# Patient Record
Sex: FEMALE | Race: WHITE | NOT HISPANIC OR LATINO
[De-identification: names, ages, dates, MRNs, and addresses within clinical notes are randomized per-mention and may not be internally consistent; named-entity substitution may affect disease eponyms.]

---

## 2017-01-03 ENCOUNTER — APPOINTMENT (OUTPATIENT)
Dept: ANTEPARTUM | Facility: CLINIC | Age: 32
End: 2017-01-03

## 2017-01-11 ENCOUNTER — APPOINTMENT (OUTPATIENT)
Dept: MATERNAL FETAL MEDICINE | Facility: CLINIC | Age: 32
End: 2017-01-11

## 2017-02-15 ENCOUNTER — INPATIENT (INPATIENT)
Facility: HOSPITAL | Age: 32
LOS: 3 days | Discharge: HOME | End: 2017-02-19
Attending: OBSTETRICS & GYNECOLOGY | Admitting: OBSTETRICS & GYNECOLOGY
Payer: COMMERCIAL

## 2017-02-15 VITALS — WEIGHT: 127.87 LBS | HEIGHT: 64 IN

## 2017-02-15 DIAGNOSIS — O24.410 GESTATIONAL DIABETES MELLITUS IN PREGNANCY, DIET CONTROLLED: ICD-10-CM

## 2017-02-15 LAB
BASOPHILS # BLD AUTO: 0 K/UL — SIGNIFICANT CHANGE UP (ref 0–0.2)
BASOPHILS NFR BLD AUTO: 0.1 % — SIGNIFICANT CHANGE UP (ref 0–2)
BLD GP AB SCN SERPL QL: POSITIVE — SIGNIFICANT CHANGE UP
EOSINOPHIL # BLD AUTO: 0.1 K/UL — SIGNIFICANT CHANGE UP (ref 0–0.5)
EOSINOPHIL NFR BLD AUTO: 0.4 % — SIGNIFICANT CHANGE UP (ref 0–6)
HCT VFR BLD CALC: 31.1 % — LOW (ref 34.5–45)
HGB BLD-MCNC: 10.2 G/DL — LOW (ref 11.5–15.5)
LYMPHOCYTES # BLD AUTO: 19.7 % — SIGNIFICANT CHANGE UP (ref 13–44)
LYMPHOCYTES # BLD AUTO: 2.5 K/UL — SIGNIFICANT CHANGE UP (ref 1–3.3)
MCHC RBC-ENTMCNC: 23.3 PG — LOW (ref 27–34)
MCHC RBC-ENTMCNC: 32.7 GM/DL — SIGNIFICANT CHANGE UP (ref 32–36)
MCV RBC AUTO: 71.4 FL — LOW (ref 80–100)
MONOCYTES # BLD AUTO: 1.1 K/UL — HIGH (ref 0–0.9)
MONOCYTES NFR BLD AUTO: 8.6 % — SIGNIFICANT CHANGE UP (ref 2–14)
NEUTROPHILS # BLD AUTO: 9 K/UL — HIGH (ref 1.8–7.4)
NEUTROPHILS NFR BLD AUTO: 71.2 % — SIGNIFICANT CHANGE UP (ref 43–77)
PLATELET # BLD AUTO: 212 K/UL — SIGNIFICANT CHANGE UP (ref 150–400)
RBC # BLD: 4.36 M/UL — SIGNIFICANT CHANGE UP (ref 3.8–5.2)
RBC # FLD: 13.5 % — SIGNIFICANT CHANGE UP (ref 10.3–14.5)
RH IG SCN BLD-IMP: NEGATIVE — SIGNIFICANT CHANGE UP
RH IG SCN BLD-IMP: NEGATIVE — SIGNIFICANT CHANGE UP
WBC # BLD: 12.6 K/UL — HIGH (ref 3.8–10.5)
WBC # FLD AUTO: 12.6 K/UL — HIGH (ref 3.8–10.5)

## 2017-02-15 PROCEDURE — 86077 PHYS BLOOD BANK SERV XMATCH: CPT

## 2017-02-15 RX ORDER — INFLUENZA VIRUS VACCINE 15; 15; 15; 15 UG/.5ML; UG/.5ML; UG/.5ML; UG/.5ML
0.5 SUSPENSION INTRAMUSCULAR ONCE
Qty: 0 | Refills: 0 | Status: COMPLETED | OUTPATIENT
Start: 2017-02-15 | End: 2017-02-15

## 2017-02-15 RX ORDER — CITRIC ACID/SODIUM CITRATE 300-500 MG
15 SOLUTION, ORAL ORAL EVERY 4 HOURS
Qty: 0 | Refills: 0 | Status: DISCONTINUED | OUTPATIENT
Start: 2017-02-15 | End: 2017-02-16

## 2017-02-15 RX ORDER — SODIUM CHLORIDE 9 MG/ML
1000 INJECTION, SOLUTION INTRAVENOUS
Qty: 0 | Refills: 0 | Status: DISCONTINUED | OUTPATIENT
Start: 2017-02-15 | End: 2017-02-16

## 2017-02-15 RX ORDER — SODIUM CHLORIDE 9 MG/ML
1000 INJECTION INTRAMUSCULAR; INTRAVENOUS; SUBCUTANEOUS
Qty: 0 | Refills: 0 | Status: DISCONTINUED | OUTPATIENT
Start: 2017-02-15 | End: 2017-02-16

## 2017-02-15 RX ORDER — SODIUM CHLORIDE 9 MG/ML
1000 INJECTION INTRAMUSCULAR; INTRAVENOUS; SUBCUTANEOUS ONCE
Qty: 0 | Refills: 0 | Status: COMPLETED | OUTPATIENT
Start: 2017-02-15 | End: 2017-02-15

## 2017-02-15 RX ORDER — OXYTOCIN 10 UNIT/ML
333.33 VIAL (ML) INJECTION
Qty: 20 | Refills: 0 | Status: DISCONTINUED | OUTPATIENT
Start: 2017-02-15 | End: 2017-02-16

## 2017-02-15 RX ADMIN — SODIUM CHLORIDE 125 MILLILITER(S): 9 INJECTION, SOLUTION INTRAVENOUS at 19:40

## 2017-02-15 RX ADMIN — SODIUM CHLORIDE 1000 MILLILITER(S): 9 INJECTION INTRAMUSCULAR; INTRAVENOUS; SUBCUTANEOUS at 19:40

## 2017-02-16 ENCOUNTER — RESULT REVIEW (OUTPATIENT)
Age: 32
End: 2017-02-16

## 2017-02-16 ENCOUNTER — TRANSCRIPTION ENCOUNTER (OUTPATIENT)
Age: 32
End: 2017-02-16

## 2017-02-16 LAB — T PALLIDUM AB TITR SER: NEGATIVE — SIGNIFICANT CHANGE UP

## 2017-02-16 RX ORDER — IBUPROFEN 200 MG
600 TABLET ORAL EVERY 6 HOURS
Qty: 0 | Refills: 0 | Status: DISCONTINUED | OUTPATIENT
Start: 2017-02-18 | End: 2017-02-19

## 2017-02-16 RX ORDER — OXYCODONE HYDROCHLORIDE 5 MG/1
5 TABLET ORAL
Qty: 0 | Refills: 0 | Status: DISCONTINUED | OUTPATIENT
Start: 2017-02-17 | End: 2017-02-19

## 2017-02-16 RX ORDER — DEXAMETHASONE 0.5 MG/5ML
4 ELIXIR ORAL EVERY 6 HOURS
Qty: 0 | Refills: 0 | Status: DISCONTINUED | OUTPATIENT
Start: 2017-02-16 | End: 2017-02-18

## 2017-02-16 RX ORDER — SODIUM CHLORIDE 9 MG/ML
1000 INJECTION, SOLUTION INTRAVENOUS
Qty: 0 | Refills: 0 | Status: DISCONTINUED | OUTPATIENT
Start: 2017-02-16 | End: 2017-02-16

## 2017-02-16 RX ORDER — DIPHENHYDRAMINE HCL 50 MG
25 CAPSULE ORAL EVERY 6 HOURS
Qty: 0 | Refills: 0 | Status: DISCONTINUED | OUTPATIENT
Start: 2017-02-17 | End: 2017-02-19

## 2017-02-16 RX ORDER — ACETAMINOPHEN 500 MG
975 TABLET ORAL EVERY 6 HOURS
Qty: 0 | Refills: 0 | Status: DISCONTINUED | OUTPATIENT
Start: 2017-02-17 | End: 2017-02-19

## 2017-02-16 RX ORDER — LANOLIN
1 OINTMENT (GRAM) TOPICAL
Qty: 0 | Refills: 0 | Status: DISCONTINUED | OUTPATIENT
Start: 2017-02-17 | End: 2017-02-19

## 2017-02-16 RX ORDER — OXYTOCIN 10 UNIT/ML
41.67 VIAL (ML) INJECTION
Qty: 20 | Refills: 0 | Status: DISCONTINUED | OUTPATIENT
Start: 2017-02-16 | End: 2017-02-16

## 2017-02-16 RX ORDER — SIMETHICONE 80 MG/1
80 TABLET, CHEWABLE ORAL EVERY 4 HOURS
Qty: 0 | Refills: 0 | Status: DISCONTINUED | OUTPATIENT
Start: 2017-02-17 | End: 2017-02-19

## 2017-02-16 RX ORDER — OXYCODONE HYDROCHLORIDE 5 MG/1
5 TABLET ORAL EVERY 4 HOURS
Qty: 0 | Refills: 0 | Status: DISCONTINUED | OUTPATIENT
Start: 2017-02-18 | End: 2017-02-19

## 2017-02-16 RX ORDER — OXYTOCIN 10 UNIT/ML
333.33 VIAL (ML) INJECTION
Qty: 20 | Refills: 0 | Status: DISCONTINUED | OUTPATIENT
Start: 2017-02-16 | End: 2017-02-19

## 2017-02-16 RX ORDER — ONDANSETRON 8 MG/1
4 TABLET, FILM COATED ORAL EVERY 6 HOURS
Qty: 0 | Refills: 0 | Status: DISCONTINUED | OUTPATIENT
Start: 2017-02-16 | End: 2017-02-18

## 2017-02-16 RX ORDER — SODIUM CHLORIDE 9 MG/ML
1000 INJECTION, SOLUTION INTRAVENOUS
Qty: 0 | Refills: 0 | Status: DISCONTINUED | OUTPATIENT
Start: 2017-02-17 | End: 2017-02-19

## 2017-02-16 RX ORDER — HEPARIN SODIUM 5000 [USP'U]/ML
5000 INJECTION INTRAVENOUS; SUBCUTANEOUS EVERY 12 HOURS
Qty: 0 | Refills: 0 | Status: DISCONTINUED | OUTPATIENT
Start: 2017-02-17 | End: 2017-02-19

## 2017-02-16 RX ORDER — OXYTOCIN 10 UNIT/ML
4 VIAL (ML) INJECTION
Qty: 30 | Refills: 0 | Status: DISCONTINUED | OUTPATIENT
Start: 2017-02-16 | End: 2017-02-19

## 2017-02-16 RX ORDER — FENTANYL CITRATE 50 UG/ML
250 INJECTION INTRAVENOUS
Qty: 0 | Refills: 0 | Status: DISCONTINUED | OUTPATIENT
Start: 2017-02-16 | End: 2017-02-18

## 2017-02-16 RX ORDER — KETOROLAC TROMETHAMINE 30 MG/ML
30 SYRINGE (ML) INJECTION EVERY 6 HOURS
Qty: 0 | Refills: 0 | Status: DISCONTINUED | OUTPATIENT
Start: 2017-02-16 | End: 2017-02-19

## 2017-02-16 RX ORDER — OXYTOCIN 10 UNIT/ML
4 VIAL (ML) INJECTION
Qty: 30 | Refills: 0 | Status: DISCONTINUED | OUTPATIENT
Start: 2017-02-16 | End: 2017-02-16

## 2017-02-16 RX ORDER — TETANUS TOXOID, REDUCED DIPHTHERIA TOXOID AND ACELLULAR PERTUSSIS VACCINE, ADSORBED 5; 2.5; 8; 8; 2.5 [IU]/.5ML; [IU]/.5ML; UG/.5ML; UG/.5ML; UG/.5ML
0.5 SUSPENSION INTRAMUSCULAR ONCE
Qty: 0 | Refills: 0 | Status: DISCONTINUED | OUTPATIENT
Start: 2017-02-17 | End: 2017-02-19

## 2017-02-16 RX ORDER — NALOXONE HYDROCHLORIDE 4 MG/.1ML
0.1 SPRAY NASAL
Qty: 0 | Refills: 0 | Status: DISCONTINUED | OUTPATIENT
Start: 2017-02-16 | End: 2017-02-18

## 2017-02-16 RX ORDER — GLYCERIN ADULT
1 SUPPOSITORY, RECTAL RECTAL AT BEDTIME
Qty: 0 | Refills: 0 | Status: DISCONTINUED | OUTPATIENT
Start: 2017-02-17 | End: 2017-02-19

## 2017-02-16 RX ADMIN — Medication 4 MILLIUNIT(S)/MIN: at 13:58

## 2017-02-16 RX ADMIN — SODIUM CHLORIDE 125 MILLILITER(S): 9 INJECTION, SOLUTION INTRAVENOUS at 04:11

## 2017-02-16 RX ADMIN — FENTANYL CITRATE 250 MILLILITER(S): 50 INJECTION INTRAVENOUS at 22:50

## 2017-02-16 RX ADMIN — Medication 15 MILLILITER(S): at 16:48

## 2017-02-16 RX ADMIN — Medication 4 MILLIUNIT(S)/MIN: at 14:36

## 2017-02-16 RX ADMIN — Medication 4 MILLIUNIT(S)/MIN: at 07:30

## 2017-02-16 RX ADMIN — Medication 15 MILLILITER(S): at 07:52

## 2017-02-16 RX ADMIN — FENTANYL CITRATE 250 MILLILITER(S): 50 INJECTION INTRAVENOUS at 21:10

## 2017-02-16 RX ADMIN — Medication 4 MILLIUNIT(S)/MIN: at 04:12

## 2017-02-16 RX ADMIN — Medication 15 MILLILITER(S): at 12:17

## 2017-02-17 LAB
BASOPHILS # BLD AUTO: 0 K/UL — SIGNIFICANT CHANGE UP (ref 0–0.2)
BASOPHILS NFR BLD AUTO: 0.1 % — SIGNIFICANT CHANGE UP (ref 0–2)
EOSINOPHIL # BLD AUTO: 0 K/UL — SIGNIFICANT CHANGE UP (ref 0–0.5)
EOSINOPHIL NFR BLD AUTO: 0.2 % — SIGNIFICANT CHANGE UP (ref 0–6)
HCT VFR BLD CALC: 27.3 % — LOW (ref 34.5–45)
HGB BLD-MCNC: 9 G/DL — LOW (ref 11.5–15.5)
KLEIHAUER-BETKE CALCULATION: 0 % — SIGNIFICANT CHANGE UP (ref 0–0.3)
LYMPHOCYTES # BLD AUTO: 1.9 K/UL — SIGNIFICANT CHANGE UP (ref 1–3.3)
LYMPHOCYTES # BLD AUTO: 10.1 % — LOW (ref 13–44)
MCHC RBC-ENTMCNC: 23.9 PG — LOW (ref 27–34)
MCHC RBC-ENTMCNC: 32.8 GM/DL — SIGNIFICANT CHANGE UP (ref 32–36)
MCV RBC AUTO: 72.9 FL — LOW (ref 80–100)
MONOCYTES # BLD AUTO: 1.3 K/UL — HIGH (ref 0–0.9)
MONOCYTES NFR BLD AUTO: 7 % — SIGNIFICANT CHANGE UP (ref 2–14)
NEUTROPHILS # BLD AUTO: 15.2 K/UL — HIGH (ref 1.8–7.4)
NEUTROPHILS NFR BLD AUTO: 82.6 % — HIGH (ref 43–77)
PLATELET # BLD AUTO: 185 K/UL — SIGNIFICANT CHANGE UP (ref 150–400)
RBC # BLD: 3.75 M/UL — LOW (ref 3.8–5.2)
RBC # FLD: 13.5 % — SIGNIFICANT CHANGE UP (ref 10.3–14.5)
WBC # BLD: 18.4 K/UL — HIGH (ref 3.8–10.5)
WBC # FLD AUTO: 18.4 K/UL — HIGH (ref 3.8–10.5)

## 2017-02-17 PROCEDURE — 88307 TISSUE EXAM BY PATHOLOGIST: CPT | Mod: 26

## 2017-02-17 RX ORDER — FERROUS SULFATE 325(65) MG
325 TABLET ORAL
Qty: 0 | Refills: 0 | Status: DISCONTINUED | OUTPATIENT
Start: 2017-02-17 | End: 2017-02-19

## 2017-02-17 RX ORDER — DOCUSATE SODIUM 100 MG
100 CAPSULE ORAL
Qty: 0 | Refills: 0 | Status: DISCONTINUED | OUTPATIENT
Start: 2017-02-17 | End: 2017-02-19

## 2017-02-17 RX ORDER — ASCORBIC ACID 60 MG
250 TABLET,CHEWABLE ORAL
Qty: 0 | Refills: 0 | Status: DISCONTINUED | OUTPATIENT
Start: 2017-02-17 | End: 2017-02-19

## 2017-02-17 RX ORDER — DOCUSATE SODIUM 100 MG
100 CAPSULE ORAL
Qty: 0 | Refills: 0 | Status: DISCONTINUED | OUTPATIENT
Start: 2017-02-17 | End: 2017-02-17

## 2017-02-17 RX ORDER — FERROUS SULFATE 325(65) MG
325 TABLET ORAL DAILY
Qty: 0 | Refills: 0 | Status: DISCONTINUED | OUTPATIENT
Start: 2017-02-17 | End: 2017-02-17

## 2017-02-17 RX ADMIN — Medication 30 MILLIGRAM(S): at 05:00

## 2017-02-17 RX ADMIN — Medication 30 MILLIGRAM(S): at 14:44

## 2017-02-17 RX ADMIN — Medication 30 MILLIGRAM(S): at 08:47

## 2017-02-17 RX ADMIN — Medication 975 MILLIGRAM(S): at 11:54

## 2017-02-17 RX ADMIN — Medication 1 TABLET(S): at 11:54

## 2017-02-17 RX ADMIN — Medication 30 MILLIGRAM(S): at 20:59

## 2017-02-17 RX ADMIN — FENTANYL CITRATE 250 MILLILITER(S): 50 INJECTION INTRAVENOUS at 07:09

## 2017-02-17 RX ADMIN — Medication 325 MILLIGRAM(S): at 08:47

## 2017-02-17 RX ADMIN — Medication 30 MILLIGRAM(S): at 09:15

## 2017-02-17 RX ADMIN — Medication 975 MILLIGRAM(S): at 23:38

## 2017-02-17 RX ADMIN — Medication 975 MILLIGRAM(S): at 17:21

## 2017-02-17 RX ADMIN — SIMETHICONE 80 MILLIGRAM(S): 80 TABLET, CHEWABLE ORAL at 23:37

## 2017-02-17 RX ADMIN — Medication 30 MILLIGRAM(S): at 03:39

## 2017-02-17 RX ADMIN — Medication 250 MILLIGRAM(S): at 17:21

## 2017-02-17 RX ADMIN — HEPARIN SODIUM 5000 UNIT(S): 5000 INJECTION INTRAVENOUS; SUBCUTANEOUS at 17:20

## 2017-02-17 RX ADMIN — HEPARIN SODIUM 5000 UNIT(S): 5000 INJECTION INTRAVENOUS; SUBCUTANEOUS at 05:00

## 2017-02-17 RX ADMIN — Medication 975 MILLIGRAM(S): at 12:40

## 2017-02-17 RX ADMIN — FENTANYL CITRATE 250 MILLILITER(S): 50 INJECTION INTRAVENOUS at 12:36

## 2017-02-17 RX ADMIN — Medication 30 MILLIGRAM(S): at 15:10

## 2017-02-17 RX ADMIN — Medication 30 MILLIGRAM(S): at 21:57

## 2017-02-17 RX ADMIN — Medication 325 MILLIGRAM(S): at 17:21

## 2017-02-17 RX ADMIN — SODIUM CHLORIDE 125 MILLILITER(S): 9 INJECTION, SOLUTION INTRAVENOUS at 12:35

## 2017-02-17 RX ADMIN — Medication 100 MILLIGRAM(S): at 17:21

## 2017-02-17 NOTE — DIETITIAN INITIAL EVALUATION ADULT. - NS AS NUTRI INTERV ED CONTENT
Pt educated on postpartum dietary recommendations, including risk of development of T2DM and reinforced importance of DM screening 6-8 weeks postpartum. Encouraged balanced, healthy meals, physical activity as permitted by MD & encouraged adequate hydration and taking prenatal multivitamin supplements.

## 2017-02-17 NOTE — DIETITIAN INITIAL EVALUATION ADULT. - OTHER INFO
pt seen for consult for GDM. pt reports during pregnancy, GDM was diet controlled; Finger sticks were always WNL c diet and exercise. pt reports a reaction to shellfish once before- broke out in hives, but other than that no other allergies. pt c some questions regarding follow up for GDM. pt confirmed taking prenantal multivitamin and DHA PTA and calcium supplements. pt reports she is planning on breastfeeding.

## 2017-02-17 NOTE — DIETITIAN INITIAL EVALUATION ADULT. - ENERGY NEEDS
ht: 5'4" , wt: 109 pounds, BMI: 18.7 kg/m2, IBW: 120 pounds +/- 10%     pt S/P  at 40.2 weeks, diet controlled GDM.

## 2017-02-18 RX ADMIN — Medication 100 MILLIGRAM(S): at 06:09

## 2017-02-18 RX ADMIN — OXYCODONE HYDROCHLORIDE 5 MILLIGRAM(S): 5 TABLET ORAL at 21:45

## 2017-02-18 RX ADMIN — Medication 975 MILLIGRAM(S): at 11:40

## 2017-02-18 RX ADMIN — OXYCODONE HYDROCHLORIDE 5 MILLIGRAM(S): 5 TABLET ORAL at 15:04

## 2017-02-18 RX ADMIN — Medication 975 MILLIGRAM(S): at 06:09

## 2017-02-18 RX ADMIN — Medication 600 MILLIGRAM(S): at 20:52

## 2017-02-18 RX ADMIN — HEPARIN SODIUM 5000 UNIT(S): 5000 INJECTION INTRAVENOUS; SUBCUTANEOUS at 18:18

## 2017-02-18 RX ADMIN — OXYCODONE HYDROCHLORIDE 5 MILLIGRAM(S): 5 TABLET ORAL at 07:56

## 2017-02-18 RX ADMIN — Medication 325 MILLIGRAM(S): at 18:18

## 2017-02-18 RX ADMIN — Medication 600 MILLIGRAM(S): at 15:04

## 2017-02-18 RX ADMIN — Medication 1 TABLET(S): at 11:40

## 2017-02-18 RX ADMIN — Medication 975 MILLIGRAM(S): at 00:30

## 2017-02-18 RX ADMIN — OXYCODONE HYDROCHLORIDE 5 MILLIGRAM(S): 5 TABLET ORAL at 20:53

## 2017-02-18 RX ADMIN — Medication 30 MILLIGRAM(S): at 06:09

## 2017-02-18 RX ADMIN — Medication 250 MILLIGRAM(S): at 18:18

## 2017-02-18 RX ADMIN — Medication 30 MILLIGRAM(S): at 06:38

## 2017-02-18 RX ADMIN — Medication 975 MILLIGRAM(S): at 18:17

## 2017-02-18 RX ADMIN — Medication 325 MILLIGRAM(S): at 11:40

## 2017-02-18 RX ADMIN — SIMETHICONE 80 MILLIGRAM(S): 80 TABLET, CHEWABLE ORAL at 06:09

## 2017-02-18 RX ADMIN — Medication 600 MILLIGRAM(S): at 14:34

## 2017-02-18 RX ADMIN — Medication 100 MILLIGRAM(S): at 18:18

## 2017-02-18 RX ADMIN — HEPARIN SODIUM 5000 UNIT(S): 5000 INJECTION INTRAVENOUS; SUBCUTANEOUS at 06:10

## 2017-02-18 RX ADMIN — OXYCODONE HYDROCHLORIDE 5 MILLIGRAM(S): 5 TABLET ORAL at 07:26

## 2017-02-18 RX ADMIN — OXYCODONE HYDROCHLORIDE 5 MILLIGRAM(S): 5 TABLET ORAL at 18:18

## 2017-02-18 RX ADMIN — Medication 975 MILLIGRAM(S): at 12:10

## 2017-02-18 RX ADMIN — Medication 975 MILLIGRAM(S): at 06:37

## 2017-02-18 RX ADMIN — OXYCODONE HYDROCHLORIDE 5 MILLIGRAM(S): 5 TABLET ORAL at 14:34

## 2017-02-18 RX ADMIN — OXYCODONE HYDROCHLORIDE 5 MILLIGRAM(S): 5 TABLET ORAL at 11:39

## 2017-02-18 RX ADMIN — OXYCODONE HYDROCHLORIDE 5 MILLIGRAM(S): 5 TABLET ORAL at 12:10

## 2017-02-18 RX ADMIN — Medication 250 MILLIGRAM(S): at 06:09

## 2017-02-18 RX ADMIN — Medication 600 MILLIGRAM(S): at 21:45

## 2017-02-19 ENCOUNTER — TRANSCRIPTION ENCOUNTER (OUTPATIENT)
Age: 32
End: 2017-02-19

## 2017-02-19 VITALS
DIASTOLIC BLOOD PRESSURE: 82 MMHG | TEMPERATURE: 98 F | SYSTOLIC BLOOD PRESSURE: 120 MMHG | HEART RATE: 87 BPM | RESPIRATION RATE: 18 BRPM | OXYGEN SATURATION: 98 %

## 2017-02-19 LAB
BASOPHILS # BLD AUTO: 0 K/UL — SIGNIFICANT CHANGE UP (ref 0–0.2)
BASOPHILS NFR BLD AUTO: 0.4 % — SIGNIFICANT CHANGE UP (ref 0–2)
EOSINOPHIL # BLD AUTO: 0.1 K/UL — SIGNIFICANT CHANGE UP (ref 0–0.5)
EOSINOPHIL NFR BLD AUTO: 1.5 % — SIGNIFICANT CHANGE UP (ref 0–6)
HCT VFR BLD CALC: 27.1 % — LOW (ref 34.5–45)
HGB BLD-MCNC: 8.7 G/DL — LOW (ref 11.5–15.5)
LYMPHOCYTES # BLD AUTO: 2.3 K/UL — SIGNIFICANT CHANGE UP (ref 1–3.3)
LYMPHOCYTES # BLD AUTO: 23.3 % — SIGNIFICANT CHANGE UP (ref 13–44)
MCHC RBC-ENTMCNC: 23.8 PG — LOW (ref 27–34)
MCHC RBC-ENTMCNC: 32.3 GM/DL — SIGNIFICANT CHANGE UP (ref 32–36)
MCV RBC AUTO: 73.7 FL — LOW (ref 80–100)
MONOCYTES # BLD AUTO: 0.8 K/UL — SIGNIFICANT CHANGE UP (ref 0–0.9)
MONOCYTES NFR BLD AUTO: 7.8 % — SIGNIFICANT CHANGE UP (ref 2–14)
NEUTROPHILS # BLD AUTO: 6.6 K/UL — SIGNIFICANT CHANGE UP (ref 1.8–7.4)
NEUTROPHILS NFR BLD AUTO: 66.9 % — SIGNIFICANT CHANGE UP (ref 43–77)
PLATELET # BLD AUTO: 201 K/UL — SIGNIFICANT CHANGE UP (ref 150–400)
RBC # BLD: 3.67 M/UL — LOW (ref 3.8–5.2)
RBC # FLD: 14.3 % — SIGNIFICANT CHANGE UP (ref 10.3–14.5)
WBC # BLD: 9.8 K/UL — SIGNIFICANT CHANGE UP (ref 3.8–10.5)
WBC # FLD AUTO: 9.8 K/UL — SIGNIFICANT CHANGE UP (ref 3.8–10.5)

## 2017-02-19 PROCEDURE — 86901 BLOOD TYPING SEROLOGIC RH(D): CPT

## 2017-02-19 PROCEDURE — 88307 TISSUE EXAM BY PATHOLOGIST: CPT

## 2017-02-19 PROCEDURE — 86780 TREPONEMA PALLIDUM: CPT

## 2017-02-19 PROCEDURE — 86850 RBC ANTIBODY SCREEN: CPT

## 2017-02-19 PROCEDURE — 59025 FETAL NON-STRESS TEST: CPT

## 2017-02-19 PROCEDURE — 85027 COMPLETE CBC AUTOMATED: CPT

## 2017-02-19 PROCEDURE — 85460 HEMOGLOBIN FETAL: CPT

## 2017-02-19 PROCEDURE — 86900 BLOOD TYPING SEROLOGIC ABO: CPT

## 2017-02-19 PROCEDURE — 86870 RBC ANTIBODY IDENTIFICATION: CPT

## 2017-02-19 PROCEDURE — 59050 FETAL MONITOR W/REPORT: CPT

## 2017-02-19 RX ORDER — IBUPROFEN 200 MG
1 TABLET ORAL
Qty: 0 | Refills: 0 | DISCHARGE
Start: 2017-02-19

## 2017-02-19 RX ORDER — OXYCODONE HYDROCHLORIDE 5 MG/1
1 TABLET ORAL
Qty: 0 | Refills: 0 | DISCHARGE
Start: 2017-02-19

## 2017-02-19 RX ORDER — FERROUS SULFATE 325(65) MG
1 TABLET ORAL
Qty: 90 | Refills: 0
Start: 2017-02-19 | End: 2017-03-21

## 2017-02-19 RX ORDER — ACETAMINOPHEN 500 MG
3 TABLET ORAL
Qty: 0 | Refills: 0 | DISCHARGE
Start: 2017-02-19

## 2017-02-19 RX ADMIN — Medication 600 MILLIGRAM(S): at 13:32

## 2017-02-19 RX ADMIN — Medication 600 MILLIGRAM(S): at 05:39

## 2017-02-19 RX ADMIN — OXYCODONE HYDROCHLORIDE 5 MILLIGRAM(S): 5 TABLET ORAL at 12:08

## 2017-02-19 RX ADMIN — Medication 600 MILLIGRAM(S): at 12:06

## 2017-02-19 RX ADMIN — Medication 1 TABLET(S): at 12:06

## 2017-02-19 RX ADMIN — Medication 100 MILLIGRAM(S): at 05:39

## 2017-02-19 RX ADMIN — Medication 975 MILLIGRAM(S): at 01:40

## 2017-02-19 RX ADMIN — Medication 325 MILLIGRAM(S): at 08:27

## 2017-02-19 RX ADMIN — Medication 975 MILLIGRAM(S): at 02:25

## 2017-02-19 RX ADMIN — Medication 250 MILLIGRAM(S): at 05:39

## 2017-02-19 RX ADMIN — OXYCODONE HYDROCHLORIDE 5 MILLIGRAM(S): 5 TABLET ORAL at 13:33

## 2017-02-19 RX ADMIN — Medication 975 MILLIGRAM(S): at 09:30

## 2017-02-19 RX ADMIN — HEPARIN SODIUM 5000 UNIT(S): 5000 INJECTION INTRAVENOUS; SUBCUTANEOUS at 05:38

## 2017-02-19 RX ADMIN — Medication 600 MILLIGRAM(S): at 06:30

## 2017-02-19 RX ADMIN — Medication 975 MILLIGRAM(S): at 08:30

## 2017-02-19 NOTE — DISCHARGE NOTE OB - MEDICATION SUMMARY - MEDICATIONS TO TAKE
I will START or STAY ON the medications listed below when I get home from the hospital:    acetaminophen 325 mg oral tablet  -- 3 tab(s) by mouth every 6 hours  -- Indication: For Encounter for full-term uncomplicated delivery    ibuprofen 600 mg oral tablet  -- 1 tab(s) by mouth every 6 hours  -- Indication: For Encounter for full-term uncomplicated delivery    oxyCODONE 5 mg oral tablet  -- 1 tab(s) by mouth every 3 hours  -- Indication: For Encounter for full-term uncomplicated delivery    oxyCODONE 5 mg oral tablet  -- 1 tab(s) by mouth every 4 hours, As needed, Severe Pain (7 - 10)  -- Indication: For Encounter for full-term uncomplicated delivery    ferrous sulfate 325 mg (65 mg elemental iron) oral tablet  -- 1 tab(s) by mouth 3 times a day  -- Indication: For Encounter for full-term uncomplicated delivery    PNV Prenatal Plus oral tablet  -- 1 tab(s) by mouth once a day  -- Indication: For Encounter for full-term uncomplicated delivery

## 2017-02-19 NOTE — DISCHARGE NOTE OB - CARE PLAN
Principal Discharge DX:	 delivery delivered  Goal:	D/C HOME  Instructions for follow-up, activity and diet:	F/U 2 WEEKS  Secondary Diagnosis:	GDM (gestational diabetes mellitus), class A1

## 2017-02-19 NOTE — DISCHARGE NOTE OB - REASON FOR ADMISSION
iup term, 40W2D, A1DM. PO CYTOTEC, CF, PITOCIN, EPI. IUPC, AMNIOINFUSION.PT HAD 1 LFT C/S FOR ARREST LABOR & NRFHRT.  MALE, APGAR 6/9. D/C POD#3.

## 2017-02-19 NOTE — DISCHARGE NOTE OB - PATIENT PORTAL LINK FT
“You can access the [employer] Patient Portal, offered by [facility], by registering with the following website: [email or web address]”

## 2017-02-19 NOTE — DISCHARGE NOTE OB - CARE PROVIDER_API CALL
Kevin Gomez), Obstetrics and Gynecology  [address]  [place] [address]  Phone: [phone]  Fax: [phone]

## 2017-02-19 NOTE — DISCHARGE NOTE OB - MATERIALS PROVIDED
Breastfeeding Log/[facility]  Screening Program/  Immunization Record/Breastfeeding Mother’s Support Group Information/Guide to Postpartum Care/Vaccinations/Breastfeeding Guide and Packet/Back To Sleep Handout

## 2017-02-19 NOTE — DISCHARGE NOTE OB - HOSPITAL COURSE
IUP 40 + WEEKS. A1 DM FOR IOL. CYTOTEC, CF, PITOCIN, EPI. ARREST OF LABOR & NRFHRT. HAD 1 LFT C/S MALE. D/C POD#3

## 2017-03-30 ENCOUNTER — RESULT REVIEW (OUTPATIENT)
Age: 32
End: 2017-03-30

## 2017-04-06 ENCOUNTER — APPOINTMENT (OUTPATIENT)
Dept: MATERNAL FETAL MEDICINE | Facility: CLINIC | Age: 32
End: 2017-04-06

## 2017-04-20 ENCOUNTER — APPOINTMENT (OUTPATIENT)
Dept: MATERNAL FETAL MEDICINE | Facility: CLINIC | Age: 32
End: 2017-04-20

## 2017-04-21 LAB
GLUCOSE 2H P 100 G GLC PO SERPL-MCNC: 126 MG/DL
GLUCOSE BS SERPL-MCNC: 73 MG/DL

## 2017-09-20 ENCOUNTER — TRANSCRIPTION ENCOUNTER (OUTPATIENT)
Age: 32
End: 2017-09-20

## 2017-12-20 ENCOUNTER — TRANSCRIPTION ENCOUNTER (OUTPATIENT)
Age: 32
End: 2017-12-20

## 2018-06-06 ENCOUNTER — RESULT REVIEW (OUTPATIENT)
Age: 33
End: 2018-06-06

## 2018-12-18 ENCOUNTER — TRANSCRIPTION ENCOUNTER (OUTPATIENT)
Age: 33
End: 2018-12-18

## 2019-02-19 ENCOUNTER — APPOINTMENT (OUTPATIENT)
Dept: INTERNAL MEDICINE | Facility: CLINIC | Age: 34
End: 2019-02-19
Payer: COMMERCIAL

## 2019-02-19 VITALS
BODY MASS INDEX: 18.61 KG/M2 | DIASTOLIC BLOOD PRESSURE: 70 MMHG | TEMPERATURE: 97.9 F | HEIGHT: 64 IN | SYSTOLIC BLOOD PRESSURE: 110 MMHG | RESPIRATION RATE: 14 BRPM | OXYGEN SATURATION: 99 % | WEIGHT: 109 LBS | HEART RATE: 74 BPM

## 2019-02-19 DIAGNOSIS — D56.9 THALASSEMIA, UNSPECIFIED: ICD-10-CM

## 2019-02-19 DIAGNOSIS — Z87.891 PERSONAL HISTORY OF NICOTINE DEPENDENCE: ICD-10-CM

## 2019-02-19 DIAGNOSIS — L50.9 URTICARIA, UNSPECIFIED: ICD-10-CM

## 2019-02-19 DIAGNOSIS — Z00.00 ENCOUNTER FOR GENERAL ADULT MEDICAL EXAMINATION W/OUT ABNORMAL FINDINGS: ICD-10-CM

## 2019-02-19 DIAGNOSIS — Z86.19 PERSONAL HISTORY OF OTHER INFECTIOUS AND PARASITIC DISEASES: ICD-10-CM

## 2019-02-19 DIAGNOSIS — Z87.898 PERSONAL HISTORY OF OTHER SPECIFIED CONDITIONS: ICD-10-CM

## 2019-02-19 DIAGNOSIS — O24.419 GESTATIONAL DIABETES MELLITUS IN PREGNANCY, UNSPECIFIED CONTROL: ICD-10-CM

## 2019-02-19 PROCEDURE — 99385 PREV VISIT NEW AGE 18-39: CPT | Mod: 25

## 2019-02-19 PROCEDURE — 36415 COLL VENOUS BLD VENIPUNCTURE: CPT

## 2019-02-19 RX ORDER — VALACYCLOVIR 500 MG/1
TABLET, FILM COATED ORAL
Refills: 0 | Status: ACTIVE | COMMUNITY

## 2019-02-19 NOTE — HISTORY OF PRESENT ILLNESS
[Health Maintenance] : health maintenance [Spouse] : spouse [] :  [___ Year(s) Ago] : [unfilled] year(s) ago [___ Son(s)] : [unfilled] son(s) [Former Cigarette Smoker] : is a former cigarette smoker [Quit Cigarettes: ___] : quit smoking [unfilled] [Occasional Use] : occasional alcohol use [Never] : has never used illicit drugs [Good] : good [Reg. 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  Hx similar x 1 her.  Denies facial/tongue swelling or sob.\par -no meds taken at onset of sx\par -denies new foods, meds, detergents, lotions, body sprays, hair products; no others in home with similar\par -hx hives with shellfish and zithromax\par \par hx gestational DM- delivered 2017, no hx medications- diet/exercise controlled\par -on prenatal MVI as open to pregnancy\par \par hx fibrocystic breasts- s/p benign biopsies, last right 2004.  \par -hx breast exam 6/18\par -hx breast specialist ethel, last seen 2004 was getting sono q 6 mo- last 2013, told nl.  Denies hx mammograms.\par -+FH mom at breast ca at 48 yo (DCIS), MGM hx breast ca, no hx genetic testing\par \par hx anemia- dx'd Mediterranean anemia since childhood\par -denies hx blood transfusion\par \par reports stable wt in past yr\par eating healthy\par exercising: stationary bike, light wts- as able, q 2 weeks.  Stays active.\par \par Denies GI or  complaints.  Denies hx STD dx.\par

## 2019-02-19 NOTE — PAST MEDICAL HISTORY
[Total Preg ___] : G[unfilled] [Living ___] : Living: [unfilled] [Menstruating] : menstruating [Definite ___ (Date)] : the last menstrual period was [unfilled] [Regular Cycle Intervals] : have been regular [Dysmenorrhea] : dysmenorrhea

## 2019-02-19 NOTE — REVIEW OF SYSTEMS
[Negative] : Psychiatric [Earache] : no earache [Chest Pain] : no chest pain [Cough] : no cough [Dyspnea on Exertion] : no dyspnea on exertion [Dizziness] : no dizziness [de-identified] : see HPI

## 2019-02-19 NOTE — ASSESSMENT
[FreeTextEntry1] : \par hx hives- on/off, unclear etiology.  Asx currently.\par -A/I referral for eval\par -sx diary advised to ID triggers; to continue to avoid known allergies (ie shellfish)\par -Benadryl prn\par -advised prompt ER eval if any facial/tongue swelling, throat sx's etc.\par \par hx gestational DM- delivered 2017, no hx medications- diet/exercise controlled\par -check A1c\par \par hx fibrocystic breasts, +FH breast ca- s/p benign biopsies, last right 2004.  Asx.\par -breast specialist referral\par -declines breast imaging, defers to specialist\par -medical genetics referral\par \par hx anemia- hx Mediterranean anemia, heavy menses, asx\par -check cbc\par \par cerumen- asx\par -advised to avoid qtips, use OTC wax drops with cotton\par -to f/u if sx onset, ENT desired\par \par splitting nails-\par -hx similar when advised to avoid nail polish by derm, not adherent- encouraged\par -check TSH\par -advised to use gloves with dish washing\par -derm eval\par \par \par HCM\par -fasting screening labs; agreeable to STD/HIV screening\par -declines flu shot\par -hx Tdap 2016\par -hx negative PAP 6/18 by GYN Dr. Johnson\par -derm referral for screening and eval of splitting nails.  Regular use of sun block for skin cancer prevention advised.\par -yearly dental screening advised\par -hx eye exam 2/19, yearly screening advised\par -cont'd smoking cessation encouraged\par \par \par Pt's cell: [phone]\par Pt declines f/u appt, yearly CPE and f/u as needed advised.

## 2019-02-19 NOTE — PHYSICAL EXAM
[No Acute Distress] : no acute distress [Well-Appearing] : well-appearing [Normal Sclera/Conjunctiva] : normal sclera/conjunctiva [PERRL] : pupils equal round and reactive to light [EOMI] : extraocular movements intact [Normal Outer Ear/Nose] : the outer ears and nose were normal in appearance [Normal Oropharynx] : the oropharynx was normal [Normal Nasal Mucosa] : the nasal mucosa was normal [Supple] : supple [No Lymphadenopathy] : no lymphadenopathy [Thyroid Normal, No Nodules] : the thyroid was normal and there were no nodules present [No Respiratory Distress] : no respiratory distress  [Clear to Auscultation] : lungs were clear to auscultation bilaterally [Normal Rate] : normal rate  [Regular Rhythm] : with a regular rhythm [Normal S1, S2] : normal S1 and S2 [No Murmur] : no murmur heard [Pedal Pulses Present] : the pedal pulses are present [No Edema] : there was no peripheral edema [Soft] : abdomen soft [Non Tender] : non-tender [No HSM] : no HSM [Normal Supraclavicular Nodes] : no supraclavicular lymphadenopathy [Normal Posterior Cervical Nodes] : no posterior cervical lymphadenopathy [Normal Anterior Cervical Nodes] : no anterior cervical lymphadenopathy [No CVA Tenderness] : no CVA  tenderness [No Spinal Tenderness] : no spinal tenderness [No Joint Swelling] : no joint swelling [Grossly Normal Strength/Tone] : grossly normal strength/tone [No Rash] : no rash [Normal Gait] : normal gait [No Focal Deficits] : no focal deficits [Normal Affect] : the affect was normal [Alert and Oriented x3] : oriented to person, place, and time [de-identified] : moderate b/l cerumen [de-identified] : scattered freckles, b/l thumbnails with thickened slightly splitting nails, no rash

## 2019-02-19 NOTE — HEALTH RISK ASSESSMENT
28-Aug-2017 16:15 [Patient reported PAP Smear was normal] : Patient reported PAP Smear was normal [HIV Test offered] : HIV Test offered [0] : 2) Feeling down, depressed, or hopeless: Not at all (0) [PapSmearDate] : 6/18

## 2019-02-20 DIAGNOSIS — R71.8 OTHER ABNORMALITY OF RED BLOOD CELLS: ICD-10-CM

## 2019-02-21 ENCOUNTER — TRANSCRIPTION ENCOUNTER (OUTPATIENT)
Age: 34
End: 2019-02-21

## 2019-02-26 LAB
ALBUMIN SERPL ELPH-MCNC: 4.8 G/DL
ALP BLD-CCNC: 47 U/L
ALT SERPL-CCNC: 15 U/L
ANION GAP SERPL CALC-SCNC: 13 MMOL/L
AST SERPL-CCNC: 17 U/L
BASOPHILS # BLD AUTO: 0.02 K/UL
BASOPHILS NFR BLD AUTO: 0.5 %
BILIRUB SERPL-MCNC: 0.4 MG/DL
BUN SERPL-MCNC: 25 MG/DL
C TRACH RRNA SPEC QL NAA+PROBE: NOT DETECTED
CALCIUM SERPL-MCNC: 9.4 MG/DL
CHLORIDE SERPL-SCNC: 103 MMOL/L
CHOLEST SERPL-MCNC: 248 MG/DL
CHOLEST/HDLC SERPL: 2.6 RATIO
CO2 SERPL-SCNC: 25 MMOL/L
CREAT SERPL-MCNC: 0.67 MG/DL
EOSINOPHIL # BLD AUTO: 0.1 K/UL
EOSINOPHIL NFR BLD AUTO: 2.3 %
FERRITIN SERPL-MCNC: 31 NG/ML
GLUCOSE SERPL-MCNC: 91 MG/DL
HBA1C MFR BLD HPLC: 5 %
HBV CORE IGG+IGM SER QL: NONREACTIVE
HBV SURFACE AB SER QL: REACTIVE
HBV SURFACE AG SER QL: NONREACTIVE
HCT VFR BLD CALC: 44 %
HCV AB SER QL: NONREACTIVE
HCV S/CO RATIO: 0.14 S/CO
HDLC SERPL-MCNC: 96 MG/DL
HGB BLD-MCNC: 13.3 G/DL
HIV1+2 AB SPEC QL IA.RAPID: NONREACTIVE
IMM GRANULOCYTES NFR BLD AUTO: 0 %
IRON SATN MFR SERPL: 34 %
IRON SERPL-MCNC: 117 UG/DL
LDLC SERPL CALC-MCNC: 139 MG/DL
LYMPHOCYTES # BLD AUTO: 2.25 K/UL
LYMPHOCYTES NFR BLD AUTO: 51.4 %
MAN DIFF?: NORMAL
MCHC RBC-ENTMCNC: 22.3 PG
MCHC RBC-ENTMCNC: 30.2 GM/DL
MCV RBC AUTO: 73.7 FL
MONOCYTES # BLD AUTO: 0.44 K/UL
MONOCYTES NFR BLD AUTO: 10 %
N GONORRHOEA RRNA SPEC QL NAA+PROBE: NOT DETECTED
NEUTROPHILS # BLD AUTO: 1.57 K/UL
NEUTROPHILS NFR BLD AUTO: 35.8 %
PLATELET # BLD AUTO: 282 K/UL
POTASSIUM SERPL-SCNC: 3.8 MMOL/L
PROT SERPL-MCNC: 7.3 G/DL
RBC # BLD: 5.97 M/UL
RBC # FLD: 14.7 %
SODIUM SERPL-SCNC: 141 MMOL/L
SOURCE AMPLIFICATION: NORMAL
T PALLIDUM AB SER QL IA: NEGATIVE
TIBC SERPL-MCNC: 347 UG/DL
TRIGL SERPL-MCNC: 64 MG/DL
TSH SERPL-ACNC: 1.21 UIU/ML
UIBC SERPL-MCNC: 230 UG/DL
WBC # FLD AUTO: 4.38 K/UL

## 2019-02-28 ENCOUNTER — TRANSCRIPTION ENCOUNTER (OUTPATIENT)
Age: 34
End: 2019-02-28

## 2019-07-18 ENCOUNTER — OUTPATIENT (OUTPATIENT)
Dept: OUTPATIENT SERVICES | Facility: HOSPITAL | Age: 34
LOS: 1 days | End: 2019-07-18
Payer: COMMERCIAL

## 2019-07-18 ENCOUNTER — APPOINTMENT (OUTPATIENT)
Dept: MAMMOGRAPHY | Facility: CLINIC | Age: 34
End: 2019-07-18
Payer: COMMERCIAL

## 2019-07-18 ENCOUNTER — APPOINTMENT (OUTPATIENT)
Dept: ULTRASOUND IMAGING | Facility: CLINIC | Age: 34
End: 2019-07-18
Payer: COMMERCIAL

## 2019-07-18 DIAGNOSIS — N60.19 DIFFUSE CYSTIC MASTOPATHY OF UNSPECIFIED BREAST: ICD-10-CM

## 2019-07-18 PROCEDURE — 76641 ULTRASOUND BREAST COMPLETE: CPT

## 2019-07-18 PROCEDURE — 76641 ULTRASOUND BREAST COMPLETE: CPT | Mod: 26,50

## 2020-01-10 ENCOUNTER — TRANSCRIPTION ENCOUNTER (OUTPATIENT)
Age: 35
End: 2020-01-10

## 2020-01-22 ENCOUNTER — APPOINTMENT (OUTPATIENT)
Dept: INTERNAL MEDICINE | Facility: CLINIC | Age: 35
End: 2020-01-22
Payer: COMMERCIAL

## 2020-01-22 VITALS
DIASTOLIC BLOOD PRESSURE: 72 MMHG | WEIGHT: 109 LBS | BODY MASS INDEX: 18.61 KG/M2 | RESPIRATION RATE: 14 BRPM | HEIGHT: 64 IN | HEART RATE: 84 BPM | OXYGEN SATURATION: 98 % | TEMPERATURE: 98 F | SYSTOLIC BLOOD PRESSURE: 108 MMHG

## 2020-01-22 DIAGNOSIS — Z80.8 FAMILY HISTORY OF MALIGNANT NEOPLASM OF OTHER ORGANS OR SYSTEMS: ICD-10-CM

## 2020-01-22 DIAGNOSIS — Z80.3 FAMILY HISTORY OF MALIGNANT NEOPLASM OF BREAST: ICD-10-CM

## 2020-01-22 DIAGNOSIS — R42 DIZZINESS AND GIDDINESS: ICD-10-CM

## 2020-01-22 PROCEDURE — 99214 OFFICE O/P EST MOD 30 MIN: CPT

## 2020-01-22 RX ORDER — PRENATAL SUPPLEMENT WITH DHA 1100; 30; 1.6; 1.8; 15; 2.5; .012; 1; .15; 20; 25; 2; 1000; 200; 20; 29 [IU]/1; MG/1; MG/1; MG/1; MG/1; MG/1; MG/1; MG/1; MG/1; MG/1; MG/1; MG/1; [IU]/1; MG/1; [IU]/1; MG/1
29-1-200 CAPSULE, GELATIN COATED ORAL
Refills: 0 | Status: DISCONTINUED | COMMUNITY
End: 2020-01-22

## 2020-01-22 RX ORDER — SACCHAROMYCES BOULARDII 50 MG
250 CAPSULE ORAL
Refills: 0 | Status: DISCONTINUED | COMMUNITY
End: 2020-01-22

## 2020-01-22 RX ORDER — VITAMIN A, ASCORBIC ACID, VITAMIN D, .ALPHA.-TOCOPHEROL, THIAMINE MONONITRATE, RIBOFLAVIN, NIACIN, PYRIDOXINE HYDROCHLORIDE, FOLIC ACID, CYANOCOBALAMIN, CALCIUM, IRON, MAGNESIUM, ZINC, COPPER, AND DOCONEXENT 65-1-250MG
65-1 & 250 KIT ORAL
Refills: 0 | Status: ACTIVE | COMMUNITY
Start: 2020-01-22

## 2020-01-22 NOTE — REVIEW OF SYSTEMS
[Negative] : Integumentary [Anxiety] : no anxiety [Dizziness] : no dizziness [Depression] : no depression [FreeTextEntry4] : see HPI [de-identified] : see HPI

## 2020-01-22 NOTE — HISTORY OF PRESENT ILLNESS
[de-identified] : Feeling well.\par Fasting.\par \par c/o hives at right neck x 1 mo, on/off.  Hx similar x 1 her.  Denies facial/tongue swelling or sob.\par -no meds taken at onset of sx\par -denies new foods, meds, detergents, lotions, body sprays, hair products; no others in home with similar\par -hx hives with shellfish and zithromax\par \par hx gestational DM- delivered 2017, no hx medications- diet/exercise controlled\par -on prenatal MVI as open to pregnancy\par \par hx fibrocystic breasts- s/p benign biopsies, last right 2004.  \par -hx breast exam 6/18\par -hx breast specialist ethel, last seen 2004 was getting sono q 6 mo- last 2013, told nl.  Denies hx mammograms.\par -+FH mom at breast ca at 48 yo (DCIS), MGM hx breast ca, no hx genetic testing\par \par hx anemia- dx'd Mediterranean anemia since childhood\par -denies hx blood transfusion\par \par reports stable wt in past yr\par eating healthy\par exercising: stationary bike, light wts- as able, q 2 weeks.  Stays active.\par \par Denies GI or  complaints.  Denies hx STD dx.\par  [FreeTextEntry8] : \par c/o dizziness\par \par States 1/5-1/8/20 went to [place] (to visit uncle)- started having nasal congestion a/w min yellow/clear d/c and sinus pressure (no f/c/cough) while there, got worse during flight and when arrived.\par -went to  1/10/20- dx'd viral, negative flu swab.  Advised flonase OTC (never started).  Sx's near resolved since with rest and fluids.  \par \par Last week ~ 7d ago, had onset of dizziness while moving head in bed, room spinning sensation.  \par -having same on/off since, drinking more water since onset and feeling better and happening less\par -currently had min nasal congestion with clear rhinitis and with occasional ear popping with swallowing\par -denies fever, chills, cough, ear pain/discharge/ringing, hearing loss, sob, CP, vision loss, dysarthria, focal weakness or trouble walking\par -denies GI or  complaints\par -notes rare HAs- last yesterday, not too bothersome and short lived so cant recall character details, resolved on own\par \par States her paternal uncle dx'd with glioblastoma in august 2019- is s/p surgery and home now doing well, told unlikely genetic. Denies any other FH brain cancer.  Is close with him, has been researching this and gets her worried.\par \par States trying to get pregnant, awaiting menses next week\par -is on prenatal MVI\par

## 2020-01-22 NOTE — REVIEW OF SYSTEMS
[Negative] : Integumentary [Anxiety] : no anxiety [Dizziness] : no dizziness [Depression] : no depression [FreeTextEntry4] : see HPI [de-identified] : see HPI

## 2020-01-22 NOTE — PHYSICAL EXAM
[No Acute Distress] : no acute distress [Well-Appearing] : well-appearing [Normal Sclera/Conjunctiva] : normal sclera/conjunctiva [EOMI] : extraocular movements intact [PERRL] : pupils equal round and reactive to light [Normal Outer Ear/Nose] : the outer ears and nose were normal in appearance [Supple] : supple [No Lymphadenopathy] : no lymphadenopathy [Thyroid Normal, No Nodules] : the thyroid was normal and there were no nodules present [Clear to Auscultation] : lungs were clear to auscultation bilaterally [No Respiratory Distress] : no respiratory distress  [Normal S1, S2] : normal S1 and S2 [Normal Rate] : normal rate  [Regular Rhythm] : with a regular rhythm [No Edema] : there was no peripheral edema [Pedal Pulses Present] : the pedal pulses are present [No Murmur] : no murmur heard [Non Tender] : non-tender [Soft] : abdomen soft [No HSM] : no HSM [Normal Supraclavicular Nodes] : no supraclavicular lymphadenopathy [Normal Posterior Cervical Nodes] : no posterior cervical lymphadenopathy [Normal Anterior Cervical Nodes] : no anterior cervical lymphadenopathy [No CVA Tenderness] : no CVA  tenderness [No Spinal Tenderness] : no spinal tenderness [No Joint Swelling] : no joint swelling [Grossly Normal Strength/Tone] : grossly normal strength/tone [No Rash] : no rash [Normal Gait] : normal gait [No Focal Deficits] : no focal deficits [Normal Affect] : the affect was normal [Alert and Oriented x3] : oriented to person, place, and time [Normal Oropharynx] : the oropharynx was normal [Normal TMs] : both tympanic membranes were normal [de-identified] : no photophobia, min clear nasal d/c; no nystagmus; no sinus tenderness [de-identified] : negative romberg's, negative marck hallpike [de-identified] : scattered freckles

## 2020-01-22 NOTE — HISTORY OF PRESENT ILLNESS
[de-identified] : Feeling well.\par Fasting.\par \par c/o hives at right neck x 1 mo, on/off.  Hx similar x 1 her.  Denies facial/tongue swelling or sob.\par -no meds taken at onset of sx\par -denies new foods, meds, detergents, lotions, body sprays, hair products; no others in home with similar\par -hx hives with shellfish and zithromax\par \par hx gestational DM- delivered 2017, no hx medications- diet/exercise controlled\par -on prenatal MVI as open to pregnancy\par \par hx fibrocystic breasts- s/p benign biopsies, last right 2004.  \par -hx breast exam 6/18\par -hx breast specialist ethel, last seen 2004 was getting sono q 6 mo- last 2013, told nl.  Denies hx mammograms.\par -+FH mom at breast ca at 48 yo (DCIS), MGM hx breast ca, no hx genetic testing\par \par hx anemia- dx'd Mediterranean anemia since childhood\par -denies hx blood transfusion\par \par reports stable wt in past yr\par eating healthy\par exercising: stationary bike, light wts- as able, q 2 weeks.  Stays active.\par \par Denies GI or  complaints.  Denies hx STD dx.\par  [FreeTextEntry8] : \par c/o dizziness\par \par States 1/5-1/8/20 went to [place] (to visit uncle)- started having nasal congestion a/w min yellow/clear d/c and sinus pressure (no f/c/cough) while there, got worse during flight and when arrived.\par -went to  1/10/20- dx'd viral, negative flu swab.  Advised flonase OTC (never started).  Sx's near resolved since with rest and fluids.  \par \par Last week ~ 7d ago, had onset of dizziness while moving head in bed, room spinning sensation.  \par -having same on/off since, drinking more water since onset and feeling better and happening less\par -currently had min nasal congestion with clear rhinitis and with occasional ear popping with swallowing\par -denies fever, chills, cough, ear pain/discharge/ringing, hearing loss, sob, CP, vision loss, dysarthria, focal weakness or trouble walking\par -denies GI or  complaints\par -notes rare HAs- last yesterday, not too bothersome and short lived so cant recall character details, resolved on own\par \par States her paternal uncle dx'd with glioblastoma in august 2019- is s/p surgery and home now doing well, told unlikely genetic. Denies any other FH brain cancer.  Is close with him, has been researching this and gets her worried.\par \par States trying to get pregnant, awaiting menses next week\par -is on prenatal MVI\par

## 2020-01-22 NOTE — ASSESSMENT
[FreeTextEntry1] : \par vertigo- s/p recent URI, likely BPPV, getting better; neuro exam wnl\par -declines meclizine\par -advised increased hydration\par -advised Epley maneuver as able and monitor, handout from [employer] given and reviewed with pt\par -advised flonase prn \par -neurology referral given, +FH glioblastoma\par -advised to f/u if sx's worsen or do not resolve\par -advised prompt ER eval if worsen, imbalance, vision loss, focal weakness, etc.\par \par trying for pregnancy-\par -on prenatal MVI\par -cont OB f/u\par -declines pregancy testing today\par \par \par HCM\par -hx CPE 2/19, yearly advised.\par -declines flu shot\par -hx Tdap 2016\par -hx negative PAP 6/18 by GYN Dr. Johnson\par -hx eye exam 2/19, yearly screening advised\par -cont'd smoking cessation encouraged\par \par \par Pt's cell: [phone]\par Pt declines f/u appt, yearly CPE and f/u as needed advised.  Pt defers labs to CPE appointment.

## 2020-01-22 NOTE — ASSESSMENT
[FreeTextEntry1] : \par vertigo- s/p recent URI, likely BPPV, getting better; neuro exam wnl\par -declines meclizine\par -advised increased hydration\par -advised Epley maneuver as able and monitor, handout from [employer] given and reviewed with pt\par -advised flonase prn \par -neurology referral given, +FH glioblastoma\par -advised to f/u if sx's worsen or do not resolve\par -advised prompt ER eval if worsen, imbalance, vision loss, focal weakness, etc.\par \par trying for pregnancy-\par -on prenatal MVI\par -cont OB f/u\par -declines pregancy testing today\par \par \par HCM\par -hx CPE 2/19, yearly advised.\par -declines flu shot\par -hx Tdap 2016\par -hx negative PAP 6/18 by GYN Dr. Johnson\par -hx eye exam 2/19, yearly screening advised\par -cont'd smoking cessation encouraged\par \par \par Pt's cell: [phone]\par Pt declines f/u appt, yearly CPE and f/u as needed advised.  Pt defers labs to CPE appointment.

## 2020-01-22 NOTE — PHYSICAL EXAM
[Well-Appearing] : well-appearing [Normal Sclera/Conjunctiva] : normal sclera/conjunctiva [No Acute Distress] : no acute distress [EOMI] : extraocular movements intact [PERRL] : pupils equal round and reactive to light [Normal Outer Ear/Nose] : the outer ears and nose were normal in appearance [Supple] : supple [Thyroid Normal, No Nodules] : the thyroid was normal and there were no nodules present [No Lymphadenopathy] : no lymphadenopathy [No Respiratory Distress] : no respiratory distress  [Clear to Auscultation] : lungs were clear to auscultation bilaterally [Normal Rate] : normal rate  [Regular Rhythm] : with a regular rhythm [Normal S1, S2] : normal S1 and S2 [No Murmur] : no murmur heard [No Edema] : there was no peripheral edema [Pedal Pulses Present] : the pedal pulses are present [Non Tender] : non-tender [Soft] : abdomen soft [No HSM] : no HSM [Normal Supraclavicular Nodes] : no supraclavicular lymphadenopathy [Normal Posterior Cervical Nodes] : no posterior cervical lymphadenopathy [Normal Anterior Cervical Nodes] : no anterior cervical lymphadenopathy [No CVA Tenderness] : no CVA  tenderness [No Joint Swelling] : no joint swelling [Grossly Normal Strength/Tone] : grossly normal strength/tone [No Spinal Tenderness] : no spinal tenderness [No Rash] : no rash [No Focal Deficits] : no focal deficits [Normal Gait] : normal gait [Normal Affect] : the affect was normal [Alert and Oriented x3] : oriented to person, place, and time [Normal Oropharynx] : the oropharynx was normal [Normal TMs] : both tympanic membranes were normal [de-identified] : no photophobia, min clear nasal d/c; no nystagmus; no sinus tenderness [de-identified] : scattered freckles [de-identified] : negative romberg's, negative marck hallpike

## 2020-07-01 ENCOUNTER — ASOB RESULT (OUTPATIENT)
Age: 35
End: 2020-07-01

## 2020-07-01 ENCOUNTER — APPOINTMENT (OUTPATIENT)
Dept: ANTEPARTUM | Facility: CLINIC | Age: 35
End: 2020-07-01
Payer: COMMERCIAL

## 2020-07-01 PROCEDURE — 76811 OB US DETAILED SNGL FETUS: CPT

## 2020-09-01 ENCOUNTER — APPOINTMENT (OUTPATIENT)
Dept: INTERNAL MEDICINE | Facility: CLINIC | Age: 35
End: 2020-09-01

## 2020-11-12 ENCOUNTER — OUTPATIENT (OUTPATIENT)
Dept: OUTPATIENT SERVICES | Facility: HOSPITAL | Age: 35
LOS: 1 days | End: 2020-11-12
Payer: COMMERCIAL

## 2020-11-12 VITALS
HEART RATE: 91 BPM | WEIGHT: 141.1 LBS | DIASTOLIC BLOOD PRESSURE: 79 MMHG | RESPIRATION RATE: 16 BRPM | OXYGEN SATURATION: 98 % | HEIGHT: 64 IN | SYSTOLIC BLOOD PRESSURE: 125 MMHG | TEMPERATURE: 98 F

## 2020-11-12 DIAGNOSIS — Z29.9 ENCOUNTER FOR PROPHYLACTIC MEASURES, UNSPECIFIED: ICD-10-CM

## 2020-11-12 DIAGNOSIS — Z01.818 ENCOUNTER FOR OTHER PREPROCEDURAL EXAMINATION: ICD-10-CM

## 2020-11-12 DIAGNOSIS — Z98.890 OTHER SPECIFIED POSTPROCEDURAL STATES: Chronic | ICD-10-CM

## 2020-11-12 DIAGNOSIS — Z90.89 ACQUIRED ABSENCE OF OTHER ORGANS: Chronic | ICD-10-CM

## 2020-11-12 DIAGNOSIS — Z98.891 HISTORY OF UTERINE SCAR FROM PREVIOUS SURGERY: ICD-10-CM

## 2020-11-12 LAB
BLD GP AB SCN SERPL QL: POSITIVE — SIGNIFICANT CHANGE UP
HCT VFR BLD CALC: 29 % — LOW (ref 34.5–45)
HGB BLD-MCNC: 9.3 G/DL — LOW (ref 11.5–15.5)
MCHC RBC-ENTMCNC: 22.5 PG — LOW (ref 27–34)
MCHC RBC-ENTMCNC: 32.1 GM/DL — SIGNIFICANT CHANGE UP (ref 32–36)
MCV RBC AUTO: 70.2 FL — LOW (ref 80–100)
NRBC # BLD: 0 /100 WBCS — SIGNIFICANT CHANGE UP (ref 0–0)
PLATELET # BLD AUTO: 278 K/UL — SIGNIFICANT CHANGE UP (ref 150–400)
RBC # BLD: 4.13 M/UL — SIGNIFICANT CHANGE UP (ref 3.8–5.2)
RBC # FLD: 14.8 % — HIGH (ref 10.3–14.5)
RH IG SCN BLD-IMP: NEGATIVE — SIGNIFICANT CHANGE UP
WBC # BLD: 9.45 K/UL — SIGNIFICANT CHANGE UP (ref 3.8–10.5)
WBC # FLD AUTO: 9.45 K/UL — SIGNIFICANT CHANGE UP (ref 3.8–10.5)

## 2020-11-12 PROCEDURE — 86850 RBC ANTIBODY SCREEN: CPT

## 2020-11-12 PROCEDURE — 86077 PHYS BLOOD BANK SERV XMATCH: CPT

## 2020-11-12 PROCEDURE — 86870 RBC ANTIBODY IDENTIFICATION: CPT

## 2020-11-12 PROCEDURE — 86901 BLOOD TYPING SEROLOGIC RH(D): CPT

## 2020-11-12 PROCEDURE — G0463: CPT

## 2020-11-12 PROCEDURE — 86900 BLOOD TYPING SEROLOGIC ABO: CPT

## 2020-11-12 RX ORDER — SODIUM CHLORIDE 9 MG/ML
1000 INJECTION, SOLUTION INTRAVENOUS ONCE
Refills: 0 | Status: DISCONTINUED | OUTPATIENT
Start: 2020-11-19 | End: 2020-11-19

## 2020-11-12 RX ORDER — CEFAZOLIN SODIUM 1 G
2000 VIAL (EA) INJECTION ONCE
Refills: 0 | Status: DISCONTINUED | OUTPATIENT
Start: 2020-11-19 | End: 2020-11-21

## 2020-11-12 RX ORDER — METOCLOPRAMIDE HCL 10 MG
10 TABLET ORAL ONCE
Refills: 0 | Status: DISCONTINUED | OUTPATIENT
Start: 2020-11-19 | End: 2020-11-19

## 2020-11-12 RX ORDER — OXYTOCIN 10 UNIT/ML
333.33 VIAL (ML) INJECTION
Qty: 20 | Refills: 0 | Status: DISCONTINUED | OUTPATIENT
Start: 2020-11-19 | End: 2020-11-21

## 2020-11-12 RX ORDER — SODIUM CHLORIDE 9 MG/ML
1000 INJECTION, SOLUTION INTRAVENOUS
Refills: 0 | Status: DISCONTINUED | OUTPATIENT
Start: 2020-11-19 | End: 2020-11-19

## 2020-11-12 NOTE — OB PST NOTE - HISTORY OF PRESENT ILLNESS
33 y/o pregnant female presents for presurgical evaluation.  , EDC 20.  She is scheduled for repeat  on 20.     Preop Covid swab on 20 at her OB office.

## 2020-11-12 NOTE — OB PST NOTE - NSANTHOSAYNRD_GEN_A_CORE
No. ABHILASH screening performed.  STOP BANG Legend: 0-2 = LOW Risk; 3-4 = INTERMEDIATE Risk; 5-8 = HIGH Risk

## 2020-11-12 NOTE — OB PST NOTE - PSH
Delivery by emergency  section    S/P breast lumpectomy  right 2003  fibroadenoma  S/P T&A (status post tonsillectomy and adenoidectomy)

## 2020-11-12 NOTE — OB PST NOTE - ASSESSMENT
BRITTANII VTE 2.0 SCORE [CLOT updated 2019]    AGE RELATED RISK FACTORS                                                       MOBILITY RELATED FACTORS  [ ] Age 41-60 years                                            (1 Point)                    [ ] Bed rest                                                        (1 Point)  [ ] Age: 61-74 years                                           (2 Points)                  [ ] Plaster cast                                                   (2 Points)  [ ] Age= 75 years                                              (3 Points)                    [ ] Bed bound for more than 72 hours                 (2 Points)    DISEASE RELATED RISK FACTORS                                               GENDER SPECIFIC FACTORS  [x ] Edema in the lower extremities                       (1 Point)              [x ] Pregnancy                                                     (1 Point)  [ ] Varicose veins                                               (1 Point)                     [ ] Post-partum < 6 weeks                                   (1 Point)             [ ] BMI > 25 Kg/m2                                            (1 Point)                     [ ] Hormonal therapy  or oral contraception          (1 Point)                 [ ] Sepsis (in the previous month)                        (1 Point)               [ ] History of pregnancy complications                 (1 point)  [ ] Pneumonia or serious lung disease                                               [ ] Unexplained or recurrent                     (1 Point)           (in the previous month)                               (1 Point)  [ ] Abnormal pulmonary function test                     (1 Point)                 SURGERY RELATED RISK FACTORS  [ ] Acute myocardial infarction                              (1 Point)               [x ]  Section                                             (1 Point)  [ ] Congestive heart failure (in the previous month)  (1 Point)      [ ] Minor surgery                                                  (1 Point)   [ ] Inflammatory bowel disease                             (1 Point)               [ ] Arthroscopic surgery                                        (2 Points)  [ ] Central venous access                                      (2 Points)                [ ] General surgery lasting more than 45 minutes (2 points)  [ ] Malignancy- Present or previous                   (2 Points)                [ ] Elective arthroplasty                                         (5 points)    [ ] Stroke (in the previous month)                          (5 Points)                                                                                                                                                           HEMATOLOGY RELATED FACTORS                                                 TRAUMA RELATED RISK FACTORS  [ ] Prior episodes of VTE                                     (3 Points)                [ ] Fracture of the hip, pelvis, or leg                       (5 Points)  [ ] Positive family history for VTE                         (3 Points)             [ ] Acute spinal cord injury (in the previous month)  (5 Points)  [ ] Prothrombin 89465 A                                     (3 Points)               [ ] Paralysis  (less than 1 month)                             (5 Points)  [ ] Factor V Leiden                                             (3 Points)                  [ ] Multiple Trauma within 1 month                        (5 Points)  [ ] Lupus anticoagulants                                     (3 Points)                                                           [ ] Anticardiolipin antibodies                               (3 Points)                                                       [ ] High homocysteine in the blood                      (3 Points)                                             [ ] Other congenital or acquired thrombophilia      (3 Points)                                                [ ] Heparin induced thrombocytopenia                  (3 Points)                                     Total Score [     3     ]

## 2020-11-12 NOTE — OB PST NOTE - NSHPPHYSICALEXAM_GEN_ALL_CORE
PHYSICAL EXAM:      Constitutional:    Eyes: + PERRL    ENMT: WNL    Neck: supple    Breasts: deferred    Back: no CVA tenderness    Respiratory: lungs CTA B/L    Cardiovascular: S1 S1, RRR    Gastrointestinal: gravid uterus    Genitourinary: deferred    Rectal: deferred    Extremities: no clubbing, cyanosis, edema    Vascular: WNL    Neurological: A & O x3, no gross abnormality    Skin: cool, dry, intact    Lymph Nodes: no lymphadenopathy noted    Musculoskeletal: gait steady, moving all extremities    Psychiatric: normal affect

## 2020-11-12 NOTE — OB PST NOTE - PROBLEM SELECTOR PLAN 1
Pt. is scheduled for repeat  on 20.  Preop instructions reviewed, pt verbalized understanding.  Scheduled for Covid swab on 20 at OB office.

## 2020-11-18 ENCOUNTER — TRANSCRIPTION ENCOUNTER (OUTPATIENT)
Age: 35
End: 2020-11-18

## 2020-11-19 ENCOUNTER — NON-APPOINTMENT (OUTPATIENT)
Age: 35
End: 2020-11-19

## 2020-11-19 ENCOUNTER — INPATIENT (INPATIENT)
Facility: HOSPITAL | Age: 35
LOS: 1 days | Discharge: HOME | End: 2020-11-21
Attending: OBSTETRICS & GYNECOLOGY | Admitting: OBSTETRICS & GYNECOLOGY
Payer: COMMERCIAL

## 2020-11-19 VITALS
DIASTOLIC BLOOD PRESSURE: 82 MMHG | HEART RATE: 83 BPM | TEMPERATURE: 98 F | SYSTOLIC BLOOD PRESSURE: 130 MMHG | OXYGEN SATURATION: 100 % | WEIGHT: 138.89 LBS | HEIGHT: 64 IN | RESPIRATION RATE: 20 BRPM

## 2020-11-19 DIAGNOSIS — Z98.890 OTHER SPECIFIED POSTPROCEDURAL STATES: Chronic | ICD-10-CM

## 2020-11-19 DIAGNOSIS — Z90.89 ACQUIRED ABSENCE OF OTHER ORGANS: Chronic | ICD-10-CM

## 2020-11-19 LAB
BASOPHILS # BLD AUTO: 0 K/UL — SIGNIFICANT CHANGE UP (ref 0–0.2)
BASOPHILS NFR BLD AUTO: 0 % — SIGNIFICANT CHANGE UP (ref 0–2)
BLD GP AB SCN SERPL QL: NEGATIVE — SIGNIFICANT CHANGE UP
EOSINOPHIL # BLD AUTO: 0.08 K/UL — SIGNIFICANT CHANGE UP (ref 0–0.5)
EOSINOPHIL NFR BLD AUTO: 0.9 % — SIGNIFICANT CHANGE UP (ref 0–6)
HCT VFR BLD CALC: 28.6 % — LOW (ref 34.5–45)
HGB BLD-MCNC: 9 G/DL — LOW (ref 11.5–15.5)
LYMPHOCYTES # BLD AUTO: 1.18 K/UL — SIGNIFICANT CHANGE UP (ref 1–3.3)
LYMPHOCYTES # BLD AUTO: 12.9 % — LOW (ref 13–44)
MCHC RBC-ENTMCNC: 22.1 PG — LOW (ref 27–34)
MCHC RBC-ENTMCNC: 31.5 GM/DL — LOW (ref 32–36)
MCV RBC AUTO: 70.3 FL — LOW (ref 80–100)
MONOCYTES # BLD AUTO: 0.24 K/UL — SIGNIFICANT CHANGE UP (ref 0–0.9)
MONOCYTES NFR BLD AUTO: 2.6 % — SIGNIFICANT CHANGE UP (ref 2–14)
NEUTROPHILS # BLD AUTO: 7.62 K/UL — HIGH (ref 1.8–7.4)
NEUTROPHILS NFR BLD AUTO: 83.6 % — HIGH (ref 43–77)
PLATELET # BLD AUTO: 277 K/UL — SIGNIFICANT CHANGE UP (ref 150–400)
RBC # BLD: 4.07 M/UL — SIGNIFICANT CHANGE UP (ref 3.8–5.2)
RBC # FLD: 15.4 % — HIGH (ref 10.3–14.5)
RH IG SCN BLD-IMP: NEGATIVE — SIGNIFICANT CHANGE UP
T PALLIDUM AB TITR SER: NEGATIVE — SIGNIFICANT CHANGE UP
WBC # BLD: 9.11 K/UL — SIGNIFICANT CHANGE UP (ref 3.8–10.5)
WBC # FLD AUTO: 9.11 K/UL — SIGNIFICANT CHANGE UP (ref 3.8–10.5)

## 2020-11-19 RX ORDER — SODIUM CHLORIDE 9 MG/ML
1000 INJECTION, SOLUTION INTRAVENOUS
Refills: 0 | Status: DISCONTINUED | OUTPATIENT
Start: 2020-11-19 | End: 2020-11-21

## 2020-11-19 RX ORDER — OXYTOCIN 10 UNIT/ML
333.33 VIAL (ML) INJECTION
Qty: 20 | Refills: 0 | Status: DISCONTINUED | OUTPATIENT
Start: 2020-11-19 | End: 2020-11-21

## 2020-11-19 RX ORDER — IBUPROFEN 200 MG
600 TABLET ORAL EVERY 6 HOURS
Refills: 0 | Status: COMPLETED | OUTPATIENT
Start: 2020-11-19 | End: 2021-10-18

## 2020-11-19 RX ORDER — HEPARIN SODIUM 5000 [USP'U]/ML
5000 INJECTION INTRAVENOUS; SUBCUTANEOUS EVERY 12 HOURS
Refills: 0 | Status: DISCONTINUED | OUTPATIENT
Start: 2020-11-19 | End: 2020-11-21

## 2020-11-19 RX ORDER — KETOROLAC TROMETHAMINE 30 MG/ML
30 SYRINGE (ML) INJECTION EVERY 6 HOURS
Refills: 0 | Status: COMPLETED | OUTPATIENT
Start: 2020-11-19 | End: 2020-11-21

## 2020-11-19 RX ORDER — NALBUPHINE HYDROCHLORIDE 10 MG/ML
2.5 INJECTION, SOLUTION INTRAMUSCULAR; INTRAVENOUS; SUBCUTANEOUS EVERY 6 HOURS
Refills: 0 | Status: DISCONTINUED | OUTPATIENT
Start: 2020-11-19 | End: 2020-11-20

## 2020-11-19 RX ORDER — LANOLIN
1 OINTMENT (GRAM) TOPICAL EVERY 6 HOURS
Refills: 0 | Status: DISCONTINUED | OUTPATIENT
Start: 2020-11-19 | End: 2020-11-21

## 2020-11-19 RX ORDER — FAMOTIDINE 10 MG/ML
20 INJECTION INTRAVENOUS ONCE
Refills: 0 | Status: COMPLETED | OUTPATIENT
Start: 2020-11-19 | End: 2020-11-19

## 2020-11-19 RX ORDER — OXYCODONE HYDROCHLORIDE 5 MG/1
5 TABLET ORAL
Refills: 0 | Status: DISCONTINUED | OUTPATIENT
Start: 2020-11-19 | End: 2020-11-19

## 2020-11-19 RX ORDER — OXYCODONE HYDROCHLORIDE 5 MG/1
5 TABLET ORAL
Refills: 0 | Status: ACTIVE | OUTPATIENT
Start: 2020-11-19 | End: 2020-11-26

## 2020-11-19 RX ORDER — CITRIC ACID/SODIUM CITRATE 300-500 MG
15 SOLUTION, ORAL ORAL ONCE
Refills: 0 | Status: COMPLETED | OUTPATIENT
Start: 2020-11-19 | End: 2020-11-19

## 2020-11-19 RX ORDER — NALOXONE HYDROCHLORIDE 4 MG/.1ML
0.1 SPRAY NASAL
Refills: 0 | Status: DISCONTINUED | OUTPATIENT
Start: 2020-11-19 | End: 2020-11-20

## 2020-11-19 RX ORDER — OXYCODONE HYDROCHLORIDE 5 MG/1
5 TABLET ORAL ONCE
Refills: 0 | Status: DISCONTINUED | OUTPATIENT
Start: 2020-11-19 | End: 2020-11-21

## 2020-11-19 RX ORDER — DIPHENHYDRAMINE HCL 50 MG
25 CAPSULE ORAL EVERY 6 HOURS
Refills: 0 | Status: DISCONTINUED | OUTPATIENT
Start: 2020-11-19 | End: 2020-11-21

## 2020-11-19 RX ORDER — ONDANSETRON 8 MG/1
4 TABLET, FILM COATED ORAL EVERY 6 HOURS
Refills: 0 | Status: DISCONTINUED | OUTPATIENT
Start: 2020-11-19 | End: 2020-11-20

## 2020-11-19 RX ORDER — OXYCODONE HYDROCHLORIDE 5 MG/1
10 TABLET ORAL
Refills: 0 | Status: DISCONTINUED | OUTPATIENT
Start: 2020-11-19 | End: 2020-11-19

## 2020-11-19 RX ORDER — DIPHENHYDRAMINE HCL 50 MG
25 CAPSULE ORAL EVERY 4 HOURS
Refills: 0 | Status: DISCONTINUED | OUTPATIENT
Start: 2020-11-19 | End: 2020-11-20

## 2020-11-19 RX ORDER — TETANUS TOXOID, REDUCED DIPHTHERIA TOXOID AND ACELLULAR PERTUSSIS VACCINE, ADSORBED 5; 2.5; 8; 8; 2.5 [IU]/.5ML; [IU]/.5ML; UG/.5ML; UG/.5ML; UG/.5ML
0.5 SUSPENSION INTRAMUSCULAR ONCE
Refills: 0 | Status: COMPLETED | OUTPATIENT
Start: 2020-11-19

## 2020-11-19 RX ORDER — ACETAMINOPHEN 500 MG
975 TABLET ORAL
Refills: 0 | Status: DISCONTINUED | OUTPATIENT
Start: 2020-11-19 | End: 2020-11-21

## 2020-11-19 RX ORDER — DEXAMETHASONE 0.5 MG/5ML
4 ELIXIR ORAL EVERY 6 HOURS
Refills: 0 | Status: DISCONTINUED | OUTPATIENT
Start: 2020-11-19 | End: 2020-11-20

## 2020-11-19 RX ORDER — BUTORPHANOL TARTRATE 2 MG/ML
0.12 INJECTION, SOLUTION INTRAMUSCULAR; INTRAVENOUS EVERY 6 HOURS
Refills: 0 | Status: DISCONTINUED | OUTPATIENT
Start: 2020-11-19 | End: 2020-11-19

## 2020-11-19 RX ORDER — MORPHINE SULFATE 50 MG/1
0.1 CAPSULE, EXTENDED RELEASE ORAL ONCE
Refills: 0 | Status: DISCONTINUED | OUTPATIENT
Start: 2020-11-19 | End: 2020-11-20

## 2020-11-19 RX ORDER — SIMETHICONE 80 MG/1
80 TABLET, CHEWABLE ORAL EVERY 4 HOURS
Refills: 0 | Status: DISCONTINUED | OUTPATIENT
Start: 2020-11-19 | End: 2020-11-21

## 2020-11-19 RX ORDER — MAGNESIUM HYDROXIDE 400 MG/1
30 TABLET, CHEWABLE ORAL
Refills: 0 | Status: DISCONTINUED | OUTPATIENT
Start: 2020-11-19 | End: 2020-11-21

## 2020-11-19 RX ADMIN — FAMOTIDINE 20 MILLIGRAM(S): 10 INJECTION INTRAVENOUS at 09:19

## 2020-11-19 RX ADMIN — Medication 30 MILLIGRAM(S): at 17:43

## 2020-11-19 RX ADMIN — Medication 975 MILLIGRAM(S): at 13:15

## 2020-11-19 RX ADMIN — Medication 30 MILLIGRAM(S): at 18:43

## 2020-11-19 RX ADMIN — Medication 975 MILLIGRAM(S): at 20:59

## 2020-11-19 RX ADMIN — Medication 15 MILLILITER(S): at 09:19

## 2020-11-19 RX ADMIN — Medication 30 MILLIGRAM(S): at 23:54

## 2020-11-19 RX ADMIN — NALBUPHINE HYDROCHLORIDE 2.5 MILLIGRAM(S): 10 INJECTION, SOLUTION INTRAMUSCULAR; INTRAVENOUS; SUBCUTANEOUS at 16:15

## 2020-11-19 RX ADMIN — Medication 975 MILLIGRAM(S): at 21:30

## 2020-11-19 RX ADMIN — HEPARIN SODIUM 5000 UNIT(S): 5000 INJECTION INTRAVENOUS; SUBCUTANEOUS at 17:43

## 2020-11-19 NOTE — OB PROVIDER H&P - ASSESSMENT
Partially impaired: cannot see medication labels or newsprint, but can see obstacles in path, and the surrounding layout; can count fingers at arm's length
Pt is a 33 yo  at 40.5 weeks GA who presents for scheduled .

## 2020-11-19 NOTE — PROVIDER CONTACT NOTE (OTHER) - SITUATION
Patients blood pressure elevated during sitting (142/82bp 103 hr) and standing (148/81bp 98 hr) orthostatic blood pressures

## 2020-11-19 NOTE — OB PROVIDER H&P - ATTENDING COMMENTS
pt counseled, risks including not limited to bleeding infection organ damage transfusion hyst  fetal status reassuring

## 2020-11-19 NOTE — OB RN PATIENT PROFILE - ALERT: PERTINENT HISTORY
Ultra Screen at 12 Weeks/20 Week Level II Sonogram 20 Week Level II Sonogram/1st Trimester Sonogram/Ultra Screen at 12 Weeks

## 2020-11-19 NOTE — OB PROVIDER H&P - NSHPPHYSICALEXAM_GEN_ALL_CORE
Vital Signs Last 24 Hrs  T(C): 36.6 (19 Nov 2020 07:04), Max: 36.6 (19 Nov 2020 07:04)  T(F): 97.9 (19 Nov 2020 07:04), Max: 97.9 (19 Nov 2020 07:04)  HR: 92 (19 Nov 2020 08:22) (83 - 99)  BP: 130/83 (19 Nov 2020 07:11) (130/82 - 130/83)  BP(mean): --  RR: 20 (19 Nov 2020 07:04) (20 - 20)  SpO2: 99% (19 Nov 2020 08:22) (99% - 100%)    PHYSICAL EXAM:      Constitutional: alert and oriented x 3    Respiratory: clear    Cardiovascular: regular rate and rhythm    Gastrointestinal: soft, non tender    SVE: deferred    EFM: 130, mod gregg, + accels, - decels  [place]: ctx q6 minutes

## 2020-11-19 NOTE — OB PROVIDER H&P - HISTORY OF PRESENT ILLNESS
Pt is a 35 yo  at 40.5 weeks GA who presents for scheduled . She denies vb, and lof, and denies ctx and notes good FM. PNC uncomplicated.    OB: C/S '17 (NRFHT), 7.7lbs c/b A1  GYN: denies fibroids, ovarian cysts, STDs and abnormal paps  Surg: C/S x1, tonsillectomy and fibroadenoma resection  PMH: denies  Meds: PNV  Allergies: NKDA  Social: denies tobacco, alcohol and illicit drug use

## 2020-11-19 NOTE — OB PROVIDER H&P - NSANTENATALSTERA_OBGYN_ALL_OB
Patient: Bhavya Tovar Date: 2019   : 1961    62year old female      [address] / [facility] Physician: Not Applicable  Consulting Provider:  Maylin Dacosta NP  Date of Consultation/Last Comprehensive Exam:  18  Referring  Provider:  Ken Brady MD    SUBJECTIVE:    Chief Complaint:  Left 3rd toe    Wound/Ulcer Present:    Diabetic lower extremity ulcer:  Bañuelos grade 1 (superficial diabetic ulcer). Diabetic foot exam performed? Yes. Current Vascular Assessment:  Physical exam.     Current Antibiotic Regimen:  None. Current Offloading Modality:  Offloading boot. Additional Wound Category:  None     Maximum Baseline Ambulatory Status:  Ambulates with assistance    History of Present Illness: This is a 62year old female with past medical history of hypertension, diabetes, diabetic neuropathy, rheumatoid arthritis, gastric esophogeal reflux disease and sleep apnea. She presented to the hospital for a left plantar foot wound () that she had about one week. She believed she has stepped on a wood chip and is cut her foot. She does not have custom shoes for off loading. Her last A1C was 8.5 on 18. Wound care was consulted during her hospital stay for the left foot ulcer. Her work up was negative for OM. She was treated with Doxycycline 100mg po x 30 days. Patient had total contact cast to left foot and left heel wound was healed on 2018, and patient was last seen on 2018. Patient has been doing well since and she has her custom made insoles with shoes and wearing them only when going out. She wears slippers at home, otherwise. Patient returns today, 2019, with concern for a blood blister to the tip of her left D2 where she had distal amputation with no drainage. It was noted on Tuesday, 2019, and patient quickly made an appointment to return.   She also noticed and thought there was a pebble in her shoe and could not find any. She was noted to have callus to her left heel and some dermal hemorrhage. Patient states she has been doing well otherwise. She states that her diabetes has been better controlled with most recent HgbA1C of around 8. She denies any pain or shortness of breath or chest pain or fever or chill. She notices that her legs are more swollen recently. She states that she has been wearing her compression stockings but not today. She has been applying Aquaphor to her skin and uses pads to keep her callus under controlled to left heel. Current Treatment Regimen:  Dressing:  None   Frequency:  Not applicable   Changed by:  Not applicable    Review of Systems:  Pertinent items are noted in HPI (history of present illness). Past Medical History:   Diagnosis Date   â¢ Allergic rhinitis    â¢ Anxiety    â¢ Arthritis    â¢ Chronic kidney disease     stage 4 per pt   â¢ Depression    â¢ DM (diabetes mellitus) (CMS/HCC)    â¢ History of colon polyps 06/04/2018   â¢ Hypertension    â¢ Onychomycosis    â¢ Pure hypercholesterolemia 1/13/2013   â¢ Rheumatoid Arthritis    â¢ Seizure (CMS/HCC)     per pt   â¢ Sleep apnea     uses CPAP   â¢ SVT (supraventricular tachycardia) (CMS/HCC)    â¢ Wears glasses      Past Surgical History:   Procedure Laterality Date   â¢ Appendectomy  1968   â¢ Breast reduction surgery  1982   â¢ Colonoscopy diagnostic  04/22/2013    Dr. Ivon Moffett prep; no gross lesions seen; repeat 04/2018   â¢ Colonoscopy remove lesion by snare  02/04/2019    Hot Snare Splenic Flexure Polyp x1 (Unretrievable) (5 Year F/U Recommended) - Dr. Kasey Woodward   â¢ Destruc retinal lesn,photocoag  11/7/2013    Focal laser left eye Dr. Marjory Nissen   â¢ Destruc retinal lesn,photocoag  11/21/13    right GPS   â¢ Esophagogastroduodenoscopy transoral flex w/bx single or mult  08/27/15    mild esophagitis,Gastroparesis,.     â¢ Foot/toes surgery proc unlisted Left 5/2015    foot surgery   â¢ Hysterectomy  2003    for Fibroid tumors   â¢ Sigmoidoscopy remove lesion by snare  06/04/2018    Tubular Adenoma x1 - Poor Colonoscopy Prep (could only do Sigmoidoscopy) (6 Month F/U Colonoscopy Recommended) - Dr. Elio Rodarte   â¢ Umbilical hernia repair  6546'H     Social History     Socioeconomic History   â¢ Marital status: /Civil Union     Spouse name: Not on file   â¢ Number of children: Not on file   â¢ Years of education: Not on file   â¢ Highest education level: Not on file   Social Needs   â¢ Financial resource strain: Not on file   â¢ Food insecurity - worry: Not on file   â¢ Food insecurity - inability: Not on file   â¢ Transportation needs - medical: Not on file   â¢ Transportation needs - non-medical: Not on file   Occupational History   â¢ Not on file   Tobacco Use   â¢ Smoking status: Never Smoker   â¢ Smokeless tobacco: Never Used   Substance and Sexual Activity   â¢ Alcohol use: Yes     Alcohol/week: 0.6 oz     Types: 1 Standard drinks or equivalent per week     Comment: 1 wine/mo   â¢ Drug use: No   â¢ Sexual activity: Yes     Partners: Male   Other Topics Concern   â¢ Not on file   Social History Narrative   â¢ Not on file     Family History   Problem Relation Age of Onset   â¢ Cancer Mother         Lymphoma   â¢ Cancer Father         Lymphoma   â¢ Neurological Disorder Sister         2 sisters with MS   â¢ Diabetes Sister    â¢ Diabetes Maternal Grandmother    â¢ Arthritis Maternal Grandmother    â¢ Psychiatry Paternal Grandmother         schizophrenia, bipolar       Current Outpatient Medications   Medication Sig   â¢ azithromycin (ZITHROMAX) 250 MG tablet 2 TABLETS TODAY, THEN 1 TABLET DAILY FOR 4 MORE DAYS   â¢ folic acid (FOLATE) 1 MG tablet TAKE 1 TABLET BY MOUTH EVERY DAY   â¢ escitalopram (LEXAPRO) 20 MG tablet TAKE 1 TABLET BY MOUTH EVERY DAY   â¢ calcitRIOL (ROCALTROL) 0.25 MCG capsule Take 1 capsule by mouth daily.    â¢ magnesium oxide 250 MG tablet Take 1 tablet by mouth 3 days "a week. â¢ ferrous sulfate 325 (65 FE) MG tablet Take 1 tablet by mouth daily (with breakfast). â¢ furosemide (LASIX) 40 MG tablet Take 1 tablet by mouth daily. â¢ calcitRIOL (ROCALTROL) 0.25 MCG capsule Take 1 capsule by mouth daily. â¢ ranitidine (ZANTAC) 150 MG tablet Take 1-2 tablets by mouth 2 times daily. â¢ insulin regular human, CONCENTRATED, (HUMULIN R U-500 KWIKPEN) 500 UNIT/ML pen-injector 45 units in AM and 40 units in PM   â¢ traMADol (ULTRAM) 50 MG tablet Take 1 tablet by mouth every 6 hours as needed for Pain. â¢ lidocaine (LIDODERM) 5 % patch Place 2 patches onto the skin daily. Remove patch 12 hours after applying   â¢ losartan (COZAAR) 100 MG tablet Take 1 tablet by mouth daily. â¢ ergocalciferol (DRISDOL) 60458 units capsule Take 1 capsule by mouth 1 day a week. â¢ pregabalin (LYRICA) 50 MG capsule Take 1 capsule by mouth at bedtime. â¢ FREESTYLE LITE test strip TEST FOUR TIMES DAILY BEFORE MEALS AND AT BEDTIME   â¢ terazosin (HYTRIN) 5 MG capsule TAKE 1 CAPSULE BY MOUTH EVERY NIGHT AT BEDTIME   â¢ dilTIAZem (CARTIA XT) 240 MG 24 hr capsule Take 1 capsule by mouth daily   â¢ rosuvastatin (CRESTOR) 40 MG tablet TAKE 1 TABLET BY MOUTH DAILY   â¢ Insulin Pen Needle (B-D U/F PEN NEEDLE 5/16"") 31G X 8 MM Misc Use with insulin and Victoza. 5 injections daily. â¢ Blood Glucose Monitoring Suppl (FREESTYLE FREEDOM LITE) w/Device Kit 1 kit by Other route as directed. Use as instructed     No current facility-administered medications for this encounter. ALLERGIES:  Gabapentin; Levaquin [levofloxacin]; Latex   (environmental); Penicillins; Adhesive   (environmental); Januvia [sitagliptin]; Lipitor [atorvastatin calcium];  Liraglutide; and Nicotine    OBJECTIVE:  Vital Signs:    Visit Vitals  BP (!) 156/96 ([place] [facility] [place])   Pulse 100   Temp 97.5 Â°F (36.4 Â°C) (Oral)   Resp 16   LMP 01/01/2007         Physical Exam:  General appearance: Appears stated age, in no distress " and cooperative  Pulmonary: normal respiratory effort  Cardiac: Edema:  Left lower extremity  Pitting  2+ and Pulses:  Dorsalis pedis  Left  Present 2+  Extremities: edema 2+ to left and 1 + to right     Left D2 tip: Previous intact dermal hemorrhage to tip of amputation has resolved but noted to have a pinpoint opening to tip of toe. No active drainage. No erythema or increase warmth. 2/8/2019 Left D2                                       2/22/2019      Left plantar heel:  There is an open wound noted with periwound hyperkeratosis but no dermal hemorrhage. Wound debrided of slough and periwound hyperkeratosis to superficial dermal open wound. No drainage. No erythema or increase warmth. Non tender. 2/8/2019  Left Heel                                        2/22/2019             Wound Bed Quality:  as above      Silvia-wound Quality:    as above    Additional Descriptors:  as above    Wound Measurements Per Flowsheet:     Wound Foot Left Plantar (Active)   Wound Length (cm) 0.3 cm 2/22/2019 12:51 PM   Wound Width (cm) 0.2 cm 2/22/2019 12:51 PM   Wound Depth (cm) 0.2 cm 2/22/2019 12:51 PM   Wound Surface Area (cm^2) 0.06 cm^2 2/22/2019 12:51 PM   Wound Volume (cm^3) 0.01 cm^3 2/22/2019 12:51 PM   Number of days:        Wound Toe, 2nd Left Distal (Active)   Wound Care Team Consult Date 02/08/19 2/8/2019 12:34 PM   Wound Length (cm) 0.4 cm 2/8/2019 12:34 PM   Wound Width (cm) 0.5 cm 2/8/2019 12:34 PM   Wound Surface Area (cm^2) 0.2 cm^2 2/8/2019 12:34 PM   Number of days:      PROCEDURE: Left Heel    Debridement:  Selective Non-Excisional Debridement of Non-viable Tissue. Informed consent obtained. Time out was completed. Patient, procedure, and site verified. Anesthesia-  None  Size-  Less than or equal to 20 sq cm  Instrument-  Curette  Non-viable tissue removed-  Fibrin/Slough and Epidermis/dermis  Hemostasis achieved-  Pressure  Patient procedure was-  tolerated well, no complications.   Patient stable upon completion of procedure. Wound dimensions unchanged post procedure. Procedure was Performed by:  Physician      Laboratory assessments reviewed:  PREALBUMIN (mg/dL)   Date Value   09/07/2018 15.1 (L)      Albumin (g/dL)   Date Value   01/15/2019 2.5 (L)   09/08/2018 2.3 (L)   09/05/2018 2.1 (L)      No results available in last 24 hours    Lab Results   Component Value Date    WBC 8.8 09/08/2018    GLUCOSE 153 (H) 01/15/2019    HGBA1C 8.1 (H) 01/15/2019    CRP 2.1 (H) 09/04/2018    RESR 101 (H) 09/04/2018    CREATININE 2.16 (H) 01/15/2019    GFRA 29 01/15/2019    GFRNA 25 01/15/2019        Culture results:  Specimen Description (no units)   Date Value   02/07/2018 URINE, CLEAN CATCH/MIDSTREAM   11/15/2013 ABSCESS BUTTOCKS    CULTURE (no units)   Date Value   02/07/2018     <10,000 CFU/mL MIXED BACTERIAL VALENTIN WITH NO PREDOMINATING TYPE   11/15/2013 RARE MIXED VALENTIN CONSISTING OF:   11/15/2013 3 TYPE(S) OF GRAM NEGATIVE ORGANISM(S) (P)   11/15/2013 2 TYPE(S) OF GRAM POSITIVE ORGANISM(S) (P)   11/15/2013     No Staphylococcus aureus, Streptococcus pyogenes (Strep Group A) or Pseudomonas aeruginosa isolated. Diagnostic Assessments Reviewed:  X -ray (s)   Left foot Xray 9/4/18  There is no prior radiograph available for comparison. First MTP joint is  abnormal. There is oblique configuration of the MTP joint which could be  due to underlying bone erosion. The patient could have acute on chronic  osteomyelitis with possible infection of the first MTP joint. Â   Also erosion of the second MTP joint with joint space widening. Infection  of the second MTP joint is not excluded. Â   Numerous postsurgical screws. No radiographic evidence of hardware  loosening or failure. Â   On the lateral film foot, there is plantar soft tissue defects adjacent to  the calcaneus. This could be a soft tissue ulcer.  Correlation is  recommended with physical exam    MRI LEFT FOOT WITHOUT AND WITH INTRAVENOUS CONTRAST DATED 9/4/2018  IMPRESSION:  1. Plantar heel ulcer, as above. No evidence of osteomyelitis. 2. Postsurgical and degenerative changes of the forefoot with multiple  hammertoes. No osteomyelitis. 3. Additional incidental findings, as above. Â   US LOWER EXTREMITY ARTERIES DUPLEX LEFT  IMPRESSION:  No significant arterial plaque, stenosis, or obstruction  demonstrated in the left lower extremity arterial system. Â   ESR (mm/hr)   Date Value   09/04/2018 101 (H)   09/04/2018 101 (H)   06/09/2016 89 (H)     C-REACTIVE PROTEIN (mg/dL)   Date Value   09/04/2018 2.1 (H)   01/15/2018 0.9   06/09/2016 1.2 (H)   12/16/2015 1.9 (H)   01/06/2015 1.0 (H)   10/22/2013 0.9     Nutritional Assessment:  no acute concerns    Wound treatment goals are palliative:  No    DIAGNOSES:  Diabetic lower extremity ulcer, Bañuelos grade 1 (superficial diabetic ulcer) left D2 and left heel  Diabetes with peripheral neuropathy       Medical Decision Making:     Patient returns to be seen initially for new wound to left D2 and left heel on 2/8/2019. There was no open wound to left heel but dermal opening to left D2 from diabetic peripheral neuropathy. On today's assessment, 02/22/19, patient has pinpoint opening to left D2 and small dermal wound to left heel. Patient does have heavy hyperkeratosis to her left heel. Patient's edema to both legs are much improved with her wearing tubular compression, much improved. No signs of acute infection. Continue with Silvasorb to open wounds. Local wound care: Left D2 and left heel:  -clean wound with soap and water. Rinse and pat dry. -silvasorb to open wound, cover with bandaid or equivalent.  -Change 3x/week and prn.  -Moisturize intact skin. Recommended Urea cream 20 % to hyperkeratosis to left heel during day time and use mineral oil at night time. Continue compression with medium compression tubular stockings for edema control.  Patient verbalizes understanding that she needs to wear this at all times when she is weight-bearing although she may take it off at night time to sleep. Patient voices understanding of wearing her custom made insoles/shoes at all times when ambulating even at home. Patient is to continue to have close surveillance to her feet daily for any new injury. Vascular: No evidence of significant arterial disease on left leg US; palpable pulses on exam.     Infectious diseases: No signs of acute infection    Endocrine: Encourage tight glycemic control to promote wound healing; last glycohemoglobin was 8.1 on 1/5/19. Patient states she's doing well. Blood glucose in the 100's. Nutrition: Encourage proper nutrition and glycemic control for general health. Offloading: Patient is to wear her custom orthotics and inserts at all times when up and ambulating. Patient is to return in 2 weeks with provider visit. Return sooner if any issues. Plan of Care: Advanced Wound Care Recommendations:  See above  Percent Wound Closure from consult:  NA  Care plan to augment wound closure:  Not applicable. Patient stable. All questions answered.      Marcella Vera MD Not applicable as gestational age is greater than or equal to 34 weeks.

## 2020-11-19 NOTE — OB NEONATOLOGY/PEDIATRICIAN DELIVERY SUMMARY - NSPEDSNEONOTESA_OBGYN_ALL_OB_FT
40.5wk GA male infant born by repeat c/s to 35yo . maternal blood type B-, pnl neg/NR/imm, GBS unk- no labor no rupture. maternal med hx of fibroadenoma s/p breast lumpectomy. AROM at delivery, clear fluid. infant came out cyanotic with good cry and tone, delayed cord clamping x1min, brought to radiant warmer, dried and suctioned of clear secretions. comfortable on room air.   +breastfeed, no HepB

## 2020-11-19 NOTE — BRIEF OPERATIVE NOTE - OPERATION/FINDINGS
no intraperitoneal adhesions  normal uterus, tubes and ovaries  boy - cephalic, loose nuchal x1  spontaneous cry - Peds present  apgars 8-9  8lb 3oz  AROM clear flud  old scar removed

## 2020-11-19 NOTE — OB RN DELIVERY SUMMARY - NS_SEPSISRSKCALC_OBGYN_ALL_OB_FT
GBS status in the 'Prenatal Lab tests/results section' on the OB RN Patient Profile must be documented.   EOS calculated successfully. EOS Risk Factor: 0.5/1000 live births ([facility] national incidence); GA=40w5d; Temp=97.9; ROM=0; GBS='Unknown'; Antibiotics='No antibiotics or any antibiotics < 2 hrs prior to birth'

## 2020-11-19 NOTE — PROVIDER CONTACT NOTE (OTHER) - ACTION/TREATMENT ORDERED:
MD Reynolds notified and advises RN to continue to monitor bp and notify if bp continues to stay elevated

## 2020-11-19 NOTE — OB PROVIDER DELIVERY SUMMARY - NSPROVIDERDELIVERYNOTE_OBGYN_ALL_OB_FT
Procedure: rLTCSx2  Preop Dx: (1) IUP @ 40w5d (2) Hx of prior c/s  EBL: 800ml  IVF:  1.8L crystalloids  UOP: 275ml  Findings: normal appearing uterus and bilateral ovaries and fallopian tubes   Baby: Male, Cephalic, Apgars 8/9, 3718g    Eloisa Landeros MD  OBGYN PGY2 Procedure: rLTCSx2  Preop Dx: (1) IUP @ 40w5d (2) Hx of prior c/s  EBL: 800ml  IVF:  1.8L crystalloids  UOP: 275ml  Findings: normal appearing uterus and bilateral ovaries and fallopian tubes   Baby: Male, Cephalic, Apgars 8/9, 3718g  nuchal cord loose x 1  stem cells and cord tissue to bank (?viacord?)    Eloisa Landeros MD  OBGYN PGY2

## 2020-11-19 NOTE — OB RN PATIENT PROFILE - NSTOBACCONEVERSMOKERY/N_GEN_A
Verbal permission granted by patient to leave a detailed message with medical information at phone number listed in Epic. yes     Patient is male    Pt is here today with Self    HISTORY OF PRESENT ILLNESS: Fede Medrano is a 37 year old White male who is here for a follow up for Derm Problem (1 month F/U of shave biopsy).     Patient presents to office today for follow up of MOH's surgery from 10/15/2019, for nodular Basal Cell Carcinoma, biopsy proven. Patient has concerns with numbness and the size of the surgical wound. He was also to have a full skin cancer exam today but wishes to defer to another time.    Vangie Ames RN  10/28/2019  2:44 PM  Signed  Denies Latex allergy or sensitivity.    Medication verified, no changes  Refills needed today: No    REVIEW OF SYSTEMS:  Yes No  []  [x]  Pacemaker     []  [x]  Blood thinners   []  [x]  Allergy to lidocaine or epinephrine sensitivity        Medical Assistant/Nurse notes reviewed and accepted.    PHYSICAL EXAM:  Visit Vitals  Pulse 74   Wt 100.3 kg   SpO2 95%   BMI 32.67 kg/m²     General:  Patient is a well developed and well nourished and healthy appearing, average weight, white male in no acute distress  and appears of stated age.    Skin:   Examination of the scalp/body hair, face, neck, ears, eyelids/conjunctivae, and lips was performed.    There is a healing flap wound without drainage involving the right lateral nasal sidewall, extending into the right nasoalar crease. Wound edges are well approximated, with mild erythema and edema, but without tenderness or purulent exudates. Numbness is present at and around the surgical site.    ASSESSMENT:  1.) Basal cell carcinoma, 13-days s/p Mohs' MS, the excisional wound healing well without signs of infection.  2.) Postsurgical anesthesia.    PLAN:  1.) Numbness of surgical site addressed with patient in the office today. Patient informed that this is normal and does happen with surgery. Explained in  detail that swelling takes around three months to resolve.   2.) Reassured patient that the surgical site is healing well and there were no signs of infection.  3.) Continue with wound care until wound completely healed.  4.) Patient should continue to photoprotect his skin and use sunscreen regularly.     5.) Patient will return to clinic for follow up in 6 months for routine full skin cancer exam.     Contact office sooner if condition is worsening or with new concerns.    On 10/28/2019, Juana MELO scribed the services personally performed by Miko Espinal MD    The documentation recorded by the scribe accurately and completely reflects the service(s) Miko MELO MD, personally performed and the decisions made by me.   Yes

## 2020-11-20 LAB
BASOPHILS # BLD AUTO: 0.02 K/UL — SIGNIFICANT CHANGE UP (ref 0–0.2)
BASOPHILS NFR BLD AUTO: 0.1 % — SIGNIFICANT CHANGE UP (ref 0–2)
EOSINOPHIL # BLD AUTO: 0.05 K/UL — SIGNIFICANT CHANGE UP (ref 0–0.5)
EOSINOPHIL NFR BLD AUTO: 0.4 % — SIGNIFICANT CHANGE UP (ref 0–6)
HCT VFR BLD CALC: 25.9 % — LOW (ref 34.5–45)
HGB BLD-MCNC: 8.5 G/DL — LOW (ref 11.5–15.5)
IMM GRANULOCYTES NFR BLD AUTO: 0.6 % — SIGNIFICANT CHANGE UP (ref 0–1.5)
LYMPHOCYTES # BLD AUTO: 15.6 % — SIGNIFICANT CHANGE UP (ref 13–44)
LYMPHOCYTES # BLD AUTO: 2.23 K/UL — SIGNIFICANT CHANGE UP (ref 1–3.3)
MCHC RBC-ENTMCNC: 23.1 PG — LOW (ref 27–34)
MCHC RBC-ENTMCNC: 32.8 GM/DL — SIGNIFICANT CHANGE UP (ref 32–36)
MCV RBC AUTO: 70.4 FL — LOW (ref 80–100)
MONOCYTES # BLD AUTO: 1.31 K/UL — HIGH (ref 0–0.9)
MONOCYTES NFR BLD AUTO: 9.2 % — SIGNIFICANT CHANGE UP (ref 2–14)
NEUTROPHILS # BLD AUTO: 10.56 K/UL — HIGH (ref 1.8–7.4)
NEUTROPHILS NFR BLD AUTO: 74.1 % — SIGNIFICANT CHANGE UP (ref 43–77)
NRBC # BLD: 0 /100 WBCS — SIGNIFICANT CHANGE UP (ref 0–0)
PLATELET # BLD AUTO: 290 K/UL — SIGNIFICANT CHANGE UP (ref 150–400)
RBC # BLD: 3.68 M/UL — LOW (ref 3.8–5.2)
RBC # FLD: 15.4 % — HIGH (ref 10.3–14.5)
SARS-COV-2 IGG SERPL QL IA: NEGATIVE — SIGNIFICANT CHANGE UP
SARS-COV-2 IGM SERPL IA-ACNC: <0.1 INDEX — SIGNIFICANT CHANGE UP
WBC # BLD: 14.25 K/UL — HIGH (ref 3.8–10.5)
WBC # FLD AUTO: 14.25 K/UL — HIGH (ref 3.8–10.5)

## 2020-11-20 RX ORDER — IBUPROFEN 200 MG
600 TABLET ORAL EVERY 6 HOURS
Refills: 0 | Status: DISCONTINUED | OUTPATIENT
Start: 2020-11-20 | End: 2020-11-21

## 2020-11-20 RX ORDER — ASCORBIC ACID 60 MG
500 TABLET,CHEWABLE ORAL
Refills: 0 | Status: DISCONTINUED | OUTPATIENT
Start: 2020-11-20 | End: 2020-11-21

## 2020-11-20 RX ORDER — FERROUS SULFATE 325(65) MG
325 TABLET ORAL
Refills: 0 | Status: DISCONTINUED | OUTPATIENT
Start: 2020-11-20 | End: 2020-11-21

## 2020-11-20 RX ADMIN — Medication 975 MILLIGRAM(S): at 02:49

## 2020-11-20 RX ADMIN — Medication 975 MILLIGRAM(S): at 08:54

## 2020-11-20 RX ADMIN — Medication 975 MILLIGRAM(S): at 21:05

## 2020-11-20 RX ADMIN — Medication 30 MILLIGRAM(S): at 00:30

## 2020-11-20 RX ADMIN — Medication 975 MILLIGRAM(S): at 03:30

## 2020-11-20 RX ADMIN — HEPARIN SODIUM 5000 UNIT(S): 5000 INJECTION INTRAVENOUS; SUBCUTANEOUS at 17:37

## 2020-11-20 RX ADMIN — Medication 975 MILLIGRAM(S): at 15:30

## 2020-11-20 RX ADMIN — Medication 30 MILLIGRAM(S): at 05:45

## 2020-11-20 RX ADMIN — Medication 30 MILLIGRAM(S): at 11:35

## 2020-11-20 RX ADMIN — SIMETHICONE 80 MILLIGRAM(S): 80 TABLET, CHEWABLE ORAL at 23:39

## 2020-11-20 RX ADMIN — HEPARIN SODIUM 5000 UNIT(S): 5000 INJECTION INTRAVENOUS; SUBCUTANEOUS at 05:47

## 2020-11-20 RX ADMIN — Medication 30 MILLIGRAM(S): at 07:23

## 2020-11-20 RX ADMIN — Medication 600 MILLIGRAM(S): at 18:30

## 2020-11-20 RX ADMIN — Medication 325 MILLIGRAM(S): at 17:37

## 2020-11-20 RX ADMIN — Medication 600 MILLIGRAM(S): at 23:39

## 2020-11-20 RX ADMIN — Medication 30 MILLIGRAM(S): at 12:30

## 2020-11-20 RX ADMIN — Medication 600 MILLIGRAM(S): at 17:37

## 2020-11-20 RX ADMIN — Medication 500 MILLIGRAM(S): at 17:37

## 2020-11-20 RX ADMIN — Medication 975 MILLIGRAM(S): at 09:37

## 2020-11-20 RX ADMIN — Medication 975 MILLIGRAM(S): at 20:01

## 2020-11-20 RX ADMIN — Medication 975 MILLIGRAM(S): at 14:39

## 2020-11-20 NOTE — PROGRESS NOTE ADULT - SUBJECTIVE AND OBJECTIVE BOX
OB Attending Note      S: Pt feeling well, +Flatus, pain controlled    Physical exam:    Vital Signs Last 24 Hrs  T(C): 36.8 (2020 09:05), Max: 37.1 (2020 21:55)  T(F): 98.3 (2020 09:05), Max: 98.8 (2020 21:55)  HR: 90 (2020 09:05) (76 - 97)  BP: 118/81 (2020 09:05) (114/70 - 134/77)  BP(mean): 98 (2020 13:40) (97 - 101)  RR: 18 (2020 09:05) (15 - 20)  SpO2: 98% (2020 09:05) (96% - 99%)  I&O's Summary    2020 07:01  -  2020 07:00  --------------------------------------------------------  IN: 0 mL / OUT: 2775 mL / NET: -2775 mL        Gen: NAD  Abdomen: Soft, nontender, no distension , firm uterine fundus at umbilicus.  Incision: Clean, dry, and intact with steri strips  Scant Lochia  Ext: No calf tenderness    LABS:                        8.5    14.25 )-----------( 290      ( 2020 05:40 )             25.9                         9.0    9.11  )-----------( 277      ( 2020 07:25 )             28.6                         Assessment and Plan  POD #1 s/p  section  Doing well.  Continue Ambulation/OOB/Venodynes/heparin  Cont Pain medications  Regular diet  Desires  circumcision. Reviewed risk including bleeding, infection, damage to penis, need for future surgery.     Kylah Givens DO

## 2020-11-20 NOTE — PROGRESS NOTE ADULT - ASSESSMENT
35y/o  POD#1 from [facility]. No significant PMHx. H/H .6. No current issues.

## 2020-11-20 NOTE — PROGRESS NOTE ADULT - SUBJECTIVE AND OBJECTIVE BOX
R1 Progress Note     34 [facility] Patient seen and examined at bedside, no acute overnight events. No acute complaints, pain well controlled. Patient is ambulating and tolerating regular diet. Has been passing flatus, voiding spontaneously. Denies CP, SOB, N/V, HA, blurred vision, epigastric pain.    Vital Signs Last 24 Hours  T(C): 36.9 (11-20-20 @ 00:56), Max: 37.1 (11-19-20 @ 21:55)  HR: 80 (11-20-20 @ 00:56) (74 - 107)  BP: 114/70 (11-20-20 @ 00:56) (114/70 - 138/74)  RR: 18 (11-20-20 @ 02:50) (15 - 20)  SpO2: 97% (11-20-20 @ 00:56) (96% - 100%)    I&O's Summary    19 Nov 2020 07:01  -  20 Nov 2020 04:04  --------------------------------------------------------  IN: 0 mL / OUT: 2775 mL / NET: -2775 mL        Physical Exam:  General: NAD  Abdomen: Soft, non-tender, non-distended, fundus firm  Incision: Pfannenstiel incision CDI, subcuticular suture closure  Pelvic: Lochia wnl    Labs:    Blood Type: B Negative  Antibody Screen: Negative  RPR: Negative               9.0    9.11  )-----------( 277      ( 11-19 @ 07:25 )             28.6                9.3    9.45  )-----------( 278      ( 11-12 @ 20:12 )             29.0         MEDICATIONS  (STANDING):  acetaminophen   Tablet .. 975 milliGRAM(s) Oral <User Schedule>  ceFAZolin   IVPB 2000 milliGRAM(s) IV Intermittent once  diphtheria/tetanus/pertussis (acellular) Vaccine (ADAcel) 0.5 milliLiter(s) IntraMuscular once  heparin   Injectable 5000 Unit(s) SubCutaneous every 12 hours  ibuprofen  Tablet. 600 milliGRAM(s) Oral every 6 hours  ketorolac   Injectable 30 milliGRAM(s) IV Push every 6 hours  lactated ringers. 1000 milliLiter(s) (75 mL/Hr) IV Continuous <Continuous>  morphine PF Spinal 0.1 milliGRAM(s) IntraThecal. once  oxytocin Infusion 333.333 milliUNIT(s)/Min (1000 mL/Hr) IV Continuous <Continuous>  oxytocin Infusion 333.333 milliUNIT(s)/Min (1000 mL/Hr) IV Continuous <Continuous>    MEDICATIONS  (PRN):  butorphanol Injectable 0.125 milliGRAM(s) IV Push every 6 hours PRN Pruritus  dexAMETHasone  Injectable 4 milliGRAM(s) IV Push every 6 hours PRN Nausea  diphenhydrAMINE 25 milliGRAM(s) Oral every 6 hours PRN Itching  diphenhydrAMINE   Injectable 25 milliGRAM(s) IV Push every 4 hours PRN Pruritus  lanolin Ointment 1 Application(s) Topical every 6 hours PRN Sore Nipples  magnesium hydroxide Suspension 30 milliLiter(s) Oral two times a day PRN Constipation  nalbuphine Injectable 2.5 milliGRAM(s) IV Push every 6 hours PRN Pruritus  naloxone Injectable 0.1 milliGRAM(s) IV Push every 3 minutes PRN For ANY of the following changes in patient status:  A. Breaths Per Minute LESS THAN 10, B. Oxygen saturation LESS THAN 90%, C. Sedation score of 6 for Stop After: 4 Times  ondansetron Injectable 4 milliGRAM(s) IV Push every 6 hours PRN Nausea  oxyCODONE    IR 5 milliGRAM(s) Oral every 3 hours PRN Mild Pain (1 - 3)  oxyCODONE    IR 10 milliGRAM(s) Oral every 3 hours PRN Moderate Pain (4 - 6)  oxyCODONE    IR 5 milliGRAM(s) Oral every 3 hours PRN Moderate to Severe Pain (4-10)  oxyCODONE    IR 5 milliGRAM(s) Oral once PRN Moderate to Severe Pain (4-10)  simethicone 80 milliGRAM(s) Chew every 4 hours PRN Gas

## 2020-11-20 NOTE — PROGRESS NOTE ADULT - PROBLEM SELECTOR PLAN 1
- Continue with po analgesia  - Increase ambulation  - Continue regular diet  - Check CBC  - Incision dressing removed    Pablo Abreu  PGY-1

## 2020-11-20 NOTE — CHART NOTE - NSCHARTNOTEFT_GEN_A_CORE
PA NOTE       POD#1      Vital Signs Last 24 Hrs  T(C): 36.8 (2020 09:05), Max: 37.1 (2020 21:55)  T(F): 98.3 (2020 09:05), Max: 98.8 (2020 21:55)  HR: 90 (2020 09:05) (74 - 97)  BP: 118/81 (2020 09:05) (114/70 - 138/74)  BP(mean): 98 (2020 13:40) (97 - 104)  RR: 18 (2020 09:05) (15 - 20)  SpO2: 98% (2020 09:05) (96% - 99%)               8.5    14.25 )-----------( 290      ( 20 @ 05:40 )             25.9                9.0    9.11  )-----------( 277      ( 1119 @ 07:25 )             28.6           Plan: acute blood loss anemia secondary to operative delivery- asymptomatic, no current complaints. does not require transfusion at this time  - Ferrous Sulfate, Colace, Vitamin C supplementation.  - Monitor for signs/symptoms of anemia. PA NOTE       POD#1      Vital Signs Last 24 Hrs  T(C): 36.8 (2020 09:05), Max: 37.1 (2020 21:55)  T(F): 98.3 (2020 09:05), Max: 98.8 (2020 21:55)  HR: 90 (2020 09:05) (74 - 97)  BP: 118/81 (2020 09:05) (114/70 - 138/74)  BP(mean): 98 (2020 13:40) (97 - 104)  RR: 18 (2020 09:05) (15 - 20)  SpO2: 98% (2020 09:05) (96% - 99%)               8.5    14.25 )-----------( 290      ( 20 @ 05:40 )             25.9                9.0    9.11  )-----------( 277      ( 19 @ 07:25 )             28.6           Plan: acute blood loss anemia secondary to operative delivery- asymptomatic, no current complaints. does not require transfusion at this time  - Ferrous Sulfate, Vitamin C supplementation.  - Monitor for signs/symptoms of anemia.

## 2020-11-21 ENCOUNTER — TRANSCRIPTION ENCOUNTER (OUTPATIENT)
Age: 35
End: 2020-11-21

## 2020-11-21 VITALS
RESPIRATION RATE: 18 BRPM | DIASTOLIC BLOOD PRESSURE: 84 MMHG | SYSTOLIC BLOOD PRESSURE: 130 MMHG | TEMPERATURE: 98 F | HEART RATE: 95 BPM | OXYGEN SATURATION: 97 %

## 2020-11-21 PROCEDURE — 86850 RBC ANTIBODY SCREEN: CPT

## 2020-11-21 PROCEDURE — 86900 BLOOD TYPING SEROLOGIC ABO: CPT

## 2020-11-21 PROCEDURE — 86901 BLOOD TYPING SEROLOGIC RH(D): CPT

## 2020-11-21 PROCEDURE — 85025 COMPLETE CBC W/AUTO DIFF WBC: CPT

## 2020-11-21 PROCEDURE — 86769 SARS-COV-2 COVID-19 ANTIBODY: CPT

## 2020-11-21 PROCEDURE — 59050 FETAL MONITOR W/REPORT: CPT

## 2020-11-21 PROCEDURE — 90715 TDAP VACCINE 7 YRS/> IM: CPT

## 2020-11-21 PROCEDURE — 86780 TREPONEMA PALLIDUM: CPT

## 2020-11-21 PROCEDURE — 59025 FETAL NON-STRESS TEST: CPT

## 2020-11-21 RX ORDER — IBUPROFEN 200 MG
1 TABLET ORAL
Qty: 0 | Refills: 0 | DISCHARGE
Start: 2020-11-21

## 2020-11-21 RX ORDER — FERROUS SULFATE 325(65) MG
1 TABLET ORAL
Qty: 0 | Refills: 0 | DISCHARGE
Start: 2020-11-21

## 2020-11-21 RX ORDER — ACETAMINOPHEN 500 MG
3 TABLET ORAL
Qty: 0 | Refills: 0 | DISCHARGE
Start: 2020-11-21

## 2020-11-21 RX ORDER — VALACYCLOVIR 500 MG/1
1 TABLET, FILM COATED ORAL
Qty: 0 | Refills: 0 | DISCHARGE

## 2020-11-21 RX ORDER — OXYCODONE HYDROCHLORIDE 5 MG/1
1 TABLET ORAL
Qty: 0 | Refills: 0 | DISCHARGE
Start: 2020-11-21

## 2020-11-21 RX ORDER — TETANUS TOXOID, REDUCED DIPHTHERIA TOXOID AND ACELLULAR PERTUSSIS VACCINE, ADSORBED 5; 2.5; 8; 8; 2.5 [IU]/.5ML; [IU]/.5ML; UG/.5ML; UG/.5ML; UG/.5ML
0.5 SUSPENSION INTRAMUSCULAR ONCE
Refills: 0 | Status: COMPLETED | OUTPATIENT
Start: 2020-11-21 | End: 2020-11-21

## 2020-11-21 RX ADMIN — Medication 975 MILLIGRAM(S): at 08:56

## 2020-11-21 RX ADMIN — Medication 600 MILLIGRAM(S): at 05:09

## 2020-11-21 RX ADMIN — Medication 500 MILLIGRAM(S): at 05:09

## 2020-11-21 RX ADMIN — HEPARIN SODIUM 5000 UNIT(S): 5000 INJECTION INTRAVENOUS; SUBCUTANEOUS at 05:09

## 2020-11-21 RX ADMIN — TETANUS TOXOID, REDUCED DIPHTHERIA TOXOID AND ACELLULAR PERTUSSIS VACCINE, ADSORBED 0.5 MILLILITER(S): 5; 2.5; 8; 8; 2.5 SUSPENSION INTRAMUSCULAR at 05:10

## 2020-11-21 RX ADMIN — Medication 975 MILLIGRAM(S): at 02:36

## 2020-11-21 RX ADMIN — SIMETHICONE 80 MILLIGRAM(S): 80 TABLET, CHEWABLE ORAL at 03:49

## 2020-11-21 RX ADMIN — Medication 600 MILLIGRAM(S): at 00:26

## 2020-11-21 RX ADMIN — Medication 600 MILLIGRAM(S): at 06:00

## 2020-11-21 RX ADMIN — Medication 600 MILLIGRAM(S): at 11:18

## 2020-11-21 RX ADMIN — Medication 600 MILLIGRAM(S): at 11:50

## 2020-11-21 RX ADMIN — Medication 975 MILLIGRAM(S): at 03:15

## 2020-11-21 RX ADMIN — Medication 975 MILLIGRAM(S): at 09:30

## 2020-11-21 RX ADMIN — Medication 325 MILLIGRAM(S): at 05:09

## 2020-11-21 NOTE — PROGRESS NOTE ADULT - ATTENDING COMMENTS
POD2 s/p repeat  section, doing well.  -Routine postoperative care   -Reg diet   -OOB  -dc home, fu instructions reviewed.     Flor Murray DO

## 2020-11-21 NOTE — DISCHARGE NOTE OB - MEDICATION SUMMARY - MEDICATIONS TO STOP TAKING
I will STOP taking the medications listed below when I get home from the hospital:    Valtrex 500 mg oral tablet  -- 1 tab(s) by mouth once a day, As Needed

## 2020-11-21 NOTE — DISCHARGE NOTE OB - HOSPITAL COURSE
Pt was admitted for repeat CS, procedure was complicated with acute blood loss anemia which was treated conservatively with oral iron therapy. Postop course was uncomplicated and pt discharged in stable condition on POD2

## 2020-11-21 NOTE — DISCHARGE NOTE OB - CARE PROVIDER_API CALL
Katarzyna Johnson  OBSTETRICS AND GYNECOLOGY  [address], [address]  [place] [address]  Phone: [phone]  Fax: [phone]  Follow Up Time:

## 2020-11-21 NOTE — DISCHARGE NOTE OB - MATERIALS PROVIDED
Birth Certificate Instructions/Discharge Medication Information for Patients and Families Pocket Guide/Breastfeeding Log/Breastfeeding Mother’s Support Group Information/Guide to Postpartum Care/Back To Sleep Handout/Breastfeeding Guide and Packet/Vaccinations/[place] Hearing Screen Program/Shaken Baby Prevention Handout/Tdap Vaccination (VIS Pub Date: January 24, 2012)

## 2020-11-21 NOTE — PROGRESS NOTE ADULT - PROBLEM SELECTOR PLAN 1
- Continue regular diet.  - Increase ambulation.  - Continue motrin, tylenol, oxycodone PRN for pain control.    Basia Izquierdo MD PGY1

## 2020-11-21 NOTE — PROGRESS NOTE ADULT - SUBJECTIVE AND OBJECTIVE BOX
OB Progress Note: LTCS, POD#2    S: 35yo POD#2 s/p LTCS. Pain is well controlled. She is tolerating a regular diet and passing flatus. She is voiding spontaneously, and ambulating without difficulty. Denies CP/SOB. Denies lightheadedness/dizziness. Denies N/V.    O:  Vitals:  Vital Signs Last 24 Hrs  T(C): 36.6 (21 Nov 2020 05:50), Max: 37 (20 Nov 2020 22:35)  T(F): 97.9 (21 Nov 2020 05:50), Max: 98.6 (20 Nov 2020 22:35)  HR: 77 (21 Nov 2020 05:50) (77 - 98)  BP: 130/86 (21 Nov 2020 05:50) (118/72 - 130/86)  BP(mean): --  RR: 18 (21 Nov 2020 05:50) (18 - 18)  SpO2: 98% (21 Nov 2020 05:50) (97% - 99%)    MEDICATIONS  (STANDING):  acetaminophen   Tablet .. 975 milliGRAM(s) Oral <User Schedule>  ascorbic acid 500 milliGRAM(s) Oral two times a day  ceFAZolin   IVPB 2000 milliGRAM(s) IV Intermittent once  ferrous    sulfate 325 milliGRAM(s) Oral two times a day  heparin   Injectable 5000 Unit(s) SubCutaneous every 12 hours  ibuprofen  Tablet. 600 milliGRAM(s) Oral every 6 hours  ketorolac   Injectable 30 milliGRAM(s) IV Push every 6 hours  lactated ringers. 1000 milliLiter(s) (75 mL/Hr) IV Continuous <Continuous>  oxytocin Infusion 333.333 milliUNIT(s)/Min (1000 mL/Hr) IV Continuous <Continuous>  oxytocin Infusion 333.333 milliUNIT(s)/Min (1000 mL/Hr) IV Continuous <Continuous>      MEDICATIONS  (PRN):  diphenhydrAMINE 25 milliGRAM(s) Oral every 6 hours PRN Itching  lanolin Ointment 1 Application(s) Topical every 6 hours PRN Sore Nipples  magnesium hydroxide Suspension 30 milliLiter(s) Oral two times a day PRN Constipation  oxyCODONE    IR 5 milliGRAM(s) Oral once PRN Moderate to Severe Pain (4-10)  oxyCODONE    IR 5 milliGRAM(s) Oral every 3 hours PRN Moderate to Severe Pain (4-10)  simethicone 80 milliGRAM(s) Chew every 4 hours PRN Gas      Labs:  Blood type: B Negative  Rubella IgG: RPR: Negative                          8.5<L>   14.25<H> >-----------< 290    ( 11-20 @ 05:40 )             25.9<L>                        9.0<L>   9.11 >-----------< 277    ( 11-19 @ 07:25 )             28.6<L>    PE:  General: NAD  Abdomen: Soft, appropriately tender, incision c/d/i.  Extremities: No erythema, no pitting edema

## 2020-11-21 NOTE — DISCHARGE NOTE OB - PATIENT PORTAL LINK FT
You can access the FollowMyHealth Patient Portal offered by [facility] by registering at the following website: [email or web address]. By joining [employer]’s FollowMyHealth portal, you will also be able to view your health information using other applications (apps) compatible with our system.

## 2020-11-21 NOTE — DISCHARGE NOTE OB - CARE PLAN
Principal Discharge DX:	 delivery delivered  Goal:	routine postpartum care  Assessment and plan of treatment:	routine postpartum care

## 2020-11-21 NOTE — DISCHARGE NOTE OB - MEDICATION SUMMARY - MEDICATIONS TO TAKE
I will START or STAY ON the medications listed below when I get home from the hospital:    acetaminophen 325 mg oral tablet  -- 3 tab(s) by mouth   -- Indication: For pain     mg oral tablet  -- 1 tab(s) by mouth every 6 hours  -- Indication: For pain    oxyCODONE 5 mg oral tablet  -- 1 tab(s) by mouth every 4 to 6 hours, As Needed -10)  -- Indication: For pain    ferrous sulfate 325 mg (65 mg elemental iron) oral tablet  -- 1 tab(s) by mouth 2 times a day  -- Indication: For routine postpartum care    PNV Prenatal Plus oral tablet  -- 1 tab(s) by mouth once a day  -- Indication: For routine postpartum care

## 2020-11-24 ENCOUNTER — NON-APPOINTMENT (OUTPATIENT)
Age: 35
End: 2020-11-24

## 2021-01-04 ENCOUNTER — RESULT REVIEW (OUTPATIENT)
Age: 36
End: 2021-01-04

## 2021-11-29 ENCOUNTER — TRANSCRIPTION ENCOUNTER (OUTPATIENT)
Age: 36
End: 2021-11-29

## 2022-01-03 ENCOUNTER — TRANSCRIPTION ENCOUNTER (OUTPATIENT)
Age: 37
End: 2022-01-03

## 2022-01-19 ENCOUNTER — TRANSCRIPTION ENCOUNTER (OUTPATIENT)
Age: 37
End: 2022-01-19

## 2022-02-03 ENCOUNTER — TRANSCRIPTION ENCOUNTER (OUTPATIENT)
Age: 37
End: 2022-02-03

## 2022-02-11 ENCOUNTER — TRANSCRIPTION ENCOUNTER (OUTPATIENT)
Age: 37
End: 2022-02-11

## 2022-03-02 ENCOUNTER — TRANSCRIPTION ENCOUNTER (OUTPATIENT)
Age: 37
End: 2022-03-02

## 2022-03-07 ENCOUNTER — RESULT REVIEW (OUTPATIENT)
Age: 37
End: 2022-03-07

## 2022-03-30 ENCOUNTER — TRANSCRIPTION ENCOUNTER (OUTPATIENT)
Age: 37
End: 2022-03-30

## 2022-05-25 PROBLEM — O24.419 GESTATIONAL DIABETES MELLITUS IN PREGNANCY, UNSPECIFIED CONTROL: Chronic | Status: ACTIVE | Noted: 2020-11-12

## 2022-06-21 ENCOUNTER — APPOINTMENT (OUTPATIENT)
Dept: ULTRASOUND IMAGING | Facility: CLINIC | Age: 37
End: 2022-06-21
Payer: COMMERCIAL

## 2022-06-21 ENCOUNTER — OUTPATIENT (OUTPATIENT)
Dept: OUTPATIENT SERVICES | Facility: HOSPITAL | Age: 37
LOS: 1 days | End: 2022-06-21
Payer: COMMERCIAL

## 2022-06-21 DIAGNOSIS — Z98.890 OTHER SPECIFIED POSTPROCEDURAL STATES: Chronic | ICD-10-CM

## 2022-06-21 DIAGNOSIS — Z00.8 ENCOUNTER FOR OTHER GENERAL EXAMINATION: ICD-10-CM

## 2022-06-21 DIAGNOSIS — D24.9 BENIGN NEOPLASM OF UNSPECIFIED BREAST: ICD-10-CM

## 2022-06-21 DIAGNOSIS — Z90.89 ACQUIRED ABSENCE OF OTHER ORGANS: Chronic | ICD-10-CM

## 2022-06-21 PROCEDURE — 76641 ULTRASOUND BREAST COMPLETE: CPT | Mod: 26,50

## 2022-06-21 PROCEDURE — 76641 ULTRASOUND BREAST COMPLETE: CPT

## 2022-08-17 NOTE — OB PROVIDER DELIVERY SUMMARY - NSCSDICTATIONPROV_OBGYN_ALL_OB_FT
Patient presents with chest pain with inhalation and exhalation of a stabbing pain. Severe pain in am when she woke up with breathing and movement.     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  Pt reports lifting heavy groceries yesterday and almost passed out. Previously had chest pain 1-2 weeks ago that was gone after sleeping.  OTCs used: none    Patient would like communication of their results via:        Cell Phone:   Telephone Information:   Mobile [phone]     Okay to leave a message containing results? 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       HUONG Landeros

## 2022-12-25 ENCOUNTER — NON-APPOINTMENT (OUTPATIENT)
Age: 37
End: 2022-12-25

## 2023-05-02 NOTE — OB RN DELIVERY SUMMARY - NS_SEROLOGYDONE_OBGYN_ALL_OB
I attest my time as attending is greater than 50% of the total combined time spent on qualifying patient care activities by the PA/NP and attending. Yes

## 2023-10-03 ENCOUNTER — APPOINTMENT (OUTPATIENT)
Dept: DERMATOLOGY | Facility: CLINIC | Age: 38
End: 2023-10-03

## 2024-02-07 ENCOUNTER — APPOINTMENT (OUTPATIENT)
Dept: DERMATOLOGY | Facility: CLINIC | Age: 39
End: 2024-02-07
Payer: COMMERCIAL

## 2024-02-07 DIAGNOSIS — I78.1 NEVUS, NON-NEOPLASTIC: ICD-10-CM

## 2024-02-07 DIAGNOSIS — Z12.83 ENCOUNTER FOR SCREENING FOR MALIGNANT NEOPLASM OF SKIN: ICD-10-CM

## 2024-02-07 DIAGNOSIS — L81.4 OTHER MELANIN HYPERPIGMENTATION: ICD-10-CM

## 2024-02-07 DIAGNOSIS — D22.9 MELANOCYTIC NEVI, UNSPECIFIED: ICD-10-CM

## 2024-02-07 PROCEDURE — 99203 OFFICE O/P NEW LOW 30 MIN: CPT

## 2024-02-09 PROBLEM — D22.9 MULTIPLE BENIGN MELANOCYTIC NEVI: Status: ACTIVE | Noted: 2024-02-09

## 2024-02-09 PROBLEM — L81.4 SOLAR LENTIGINOSIS: Status: ACTIVE | Noted: 2024-02-09

## 2024-02-09 PROBLEM — Z12.83 SCREENING EXAM FOR SKIN CANCER: Status: ACTIVE | Noted: 2024-02-09

## 2024-02-09 NOTE — HISTORY OF PRESENT ILLNESS
[FreeTextEntry1] : NP moles, spider veins [de-identified] : JOVANNY JEREZ is a 38 year old F who presents for evaluation of the following  1. Moles and spots on body she would like evaluated- none new, growing, or changing  Also wonders what can be done about spider veins and sun spots Requesting FBSE, no personal or family hx of skin cancer

## 2024-02-09 NOTE — PHYSICAL EXAM
[FreeTextEntry3] :  AAOx3, NAD, well-appearing / pleasant Total body skin exam performed within normal limits with the exception of:   Several tan macules scattered over face and upper torso Spider veins thighs/ legs Fairly uniform and regular brown macules and papules on the trunk and extremities

## 2024-02-09 NOTE — ASSESSMENT
[FreeTextEntry1] : #Spider veins -Vascular surgery referral to discuss sclerotherapy placed  #Solar lentigos -Emphasized importance of sunscreen -Can try topical lightening agents like Vit C and azelaic acid  -Also disc Fraxel   # Multiple melanocytic nevi, all benign appearing -none c/f malignancy -Sun protection was discussed. The proper use of broad spectrum UVB/UVA sunscreen with SPF 30 or greater was reviewed and the need for re-application after swimming or sweating or 2-3 hours was emphasized. We discussed judicious use of clothing and avoidance of peak periods of sun exposure. I made the patient aware of need for year-round protection. ABCDEs of melanoma reviewed.  -Self-skin check also reviewed -Counseled pt to monitor for changes  # Screening for skin cancer -ABCDEs of melanoma, sun safety, and self-skin check reviewed -Counseled pt to notify us of any changing or concerning lesions -RTC 12 months, sooner prn

## 2024-09-18 ENCOUNTER — NON-APPOINTMENT (OUTPATIENT)
Age: 39
End: 2024-09-18

## 2024-09-19 ENCOUNTER — APPOINTMENT (OUTPATIENT)
Dept: DERMATOLOGY | Facility: CLINIC | Age: 39
End: 2024-09-19
Payer: COMMERCIAL

## 2024-09-19 VITALS — HEIGHT: 64 IN | WEIGHT: 120 LBS | BODY MASS INDEX: 20.49 KG/M2

## 2024-09-19 DIAGNOSIS — D22.9 MELANOCYTIC NEVI, UNSPECIFIED: ICD-10-CM

## 2024-09-19 PROCEDURE — 99213 OFFICE O/P EST LOW 20 MIN: CPT

## 2024-09-19 NOTE — ASSESSMENT
[FreeTextEntry1] : Nevi, clinically benign appearing.  I have counseled the patient on the importance of sun protection and monthly self skin exams at home. We have discussed proper sun protection habits and techniques, monthly self-skin exams and the ABCDEs of melanoma. I have asked the patient to notify me of any new or changing lesions.

## 2024-11-01 ENCOUNTER — NON-APPOINTMENT (OUTPATIENT)
Age: 39
End: 2024-11-01

## 2024-11-25 ENCOUNTER — RESULT REVIEW (OUTPATIENT)
Age: 39
End: 2024-11-25

## 2024-12-04 NOTE — PATIENT PROFILE OB - WEIGHT PRE-PREGNANCY IN [facility]
Anesthesia Evaluation     Patient summary reviewed, Nursing notes reviewed and pregnancy test completed   NPO Solid Status: > 6 hours  NPO Liquid Status: > 6 hours           Airway   Dental      Pulmonary - negative pulmonary ROS   Cardiovascular - negative cardio ROS        Neuro/Psych- negative ROS  GI/Hepatic/Renal/Endo    (+) obesity, thyroid problem hypothyroidism and thyroid nodules    Musculoskeletal (-) negative ROS    Abdominal    Substance History - negative use     OB/GYN    (+) Pregnant        Other - negative ROS                   Anesthesia Plan    ASA 2     epidural       Anesthetic plan, risks, benefits, and alternatives have been provided, discussed and informed consent has been obtained with: patient.    Use of blood products discussed with patient  Consented to blood products.      CODE STATUS:    Level Of Support Discussed With: Patient  Code Status (Patient has no pulse and is not breathing): CPR (Attempt to Resuscitate)  Medical Interventions (Patient has pulse or is breathing): Full Support       49

## 2025-01-23 ENCOUNTER — NON-APPOINTMENT (OUTPATIENT)
Age: 40
End: 2025-01-23

## 2025-09-17 ENCOUNTER — APPOINTMENT (OUTPATIENT)
Dept: MAMMOGRAPHY | Facility: CLINIC | Age: 40
End: 2025-09-17
Payer: COMMERCIAL

## 2025-09-17 ENCOUNTER — RESULT REVIEW (OUTPATIENT)
Age: 40
End: 2025-09-17

## 2025-09-17 ENCOUNTER — APPOINTMENT (OUTPATIENT)
Dept: ULTRASOUND IMAGING | Facility: CLINIC | Age: 40
End: 2025-09-17
Payer: COMMERCIAL

## 2025-09-17 PROCEDURE — 77067 SCR MAMMO BI INCL CAD: CPT | Mod: 26

## 2025-09-17 PROCEDURE — 76641 ULTRASOUND BREAST COMPLETE: CPT | Mod: 26,50

## 2025-09-17 PROCEDURE — 77063 BREAST TOMOSYNTHESIS BI: CPT | Mod: 26
